# Patient Record
Sex: MALE | Race: WHITE | Employment: UNEMPLOYED | URBAN - METROPOLITAN AREA
[De-identification: names, ages, dates, MRNs, and addresses within clinical notes are randomized per-mention and may not be internally consistent; named-entity substitution may affect disease eponyms.]

---

## 2017-01-10 ENCOUNTER — OFFICE VISIT (OUTPATIENT)
Dept: FAMILY MEDICINE CLINIC | Age: 22
End: 2017-01-10

## 2017-01-10 VITALS
WEIGHT: 186.19 LBS | HEIGHT: 74 IN | RESPIRATION RATE: 12 BRPM | SYSTOLIC BLOOD PRESSURE: 110 MMHG | HEART RATE: 60 BPM | BODY MASS INDEX: 23.89 KG/M2 | DIASTOLIC BLOOD PRESSURE: 64 MMHG

## 2017-01-10 DIAGNOSIS — R53.83 FATIGUE, UNSPECIFIED TYPE: ICD-10-CM

## 2017-01-10 DIAGNOSIS — S76.211A STRAIN OF GROIN, RIGHT, INITIAL ENCOUNTER: Primary | ICD-10-CM

## 2017-01-10 PROCEDURE — 99213 OFFICE O/P EST LOW 20 MIN: CPT | Performed by: FAMILY MEDICINE

## 2017-01-10 ASSESSMENT — ENCOUNTER SYMPTOMS
SORE THROAT: 0
TROUBLE SWALLOWING: 0
BACK PAIN: 0
CONSTIPATION: 0
BLOOD IN STOOL: 0
EYE PAIN: 0
CHEST TIGHTNESS: 0
COUGH: 0
NAUSEA: 0
SHORTNESS OF BREATH: 0
ABDOMINAL PAIN: 0

## 2017-01-11 ENCOUNTER — TELEPHONE (OUTPATIENT)
Dept: FAMILY MEDICINE CLINIC | Age: 22
End: 2017-01-11

## 2017-01-11 LAB
ABSOLUTE BASO #: 0 K/UL (ref 0–0.1)
ABSOLUTE EOS #: 0.2 K/UL (ref 0.1–0.4)
ABSOLUTE LYMPH #: 2.1 K/UL (ref 0.8–5.2)
ABSOLUTE MONO #: 0.3 K/UL (ref 0.1–0.9)
ABSOLUTE NEUT #: 3.2 K/UL (ref 1.3–9.1)
BASOPHILS RELATIVE PERCENT: 0.7 % (ref 0–1)
EOSINOPHILS RELATIVE PERCENT: 3.8 % (ref 1–4)
GLUCOSE: 85 MG/DL (ref 70–100)
HCT VFR BLD CALC: 45.9 % (ref 41.4–51)
HEMOGLOBIN: 15.9 G/DL (ref 13.8–17)
LYMPHOCYTE %: 36 % (ref 16–48)
MCH RBC QN AUTO: 31.1 PG (ref 27–34)
MCHC RBC AUTO-ENTMCNC: 34.6 G/DL (ref 31–36)
MCV RBC AUTO: 89.6 FL (ref 80–100)
MONOCYTES # BLD: 4.8 % (ref 1–8)
NEUTROPHILS RELATIVE PERCENT: 54.5 % (ref 45–75)
PDW BLD-RTO: 12.7 % (ref 10.8–14.8)
PLATELETS: 252 K/UL (ref 150–450)
RBC: 5.12 M/UL (ref 4–5.5)
TSH SERPL DL<=0.05 MIU/L-ACNC: 1.29 UIU/ML (ref 0.4–4.4)
WBC: 5.8 K/UL (ref 3.7–10.8)

## 2018-01-16 ENCOUNTER — OFFICE VISIT (OUTPATIENT)
Dept: FAMILY MEDICINE CLINIC | Age: 23
End: 2018-01-16

## 2018-01-16 VITALS
BODY MASS INDEX: 23.9 KG/M2 | HEART RATE: 60 BPM | WEIGHT: 186.25 LBS | SYSTOLIC BLOOD PRESSURE: 110 MMHG | HEIGHT: 74 IN | DIASTOLIC BLOOD PRESSURE: 60 MMHG | RESPIRATION RATE: 14 BRPM

## 2018-01-16 DIAGNOSIS — L72.0 INCLUSION CYST: Primary | ICD-10-CM

## 2018-01-16 ASSESSMENT — PATIENT HEALTH QUESTIONNAIRE - PHQ9
SUM OF ALL RESPONSES TO PHQ9 QUESTIONS 1 & 2: 0
1. LITTLE INTEREST OR PLEASURE IN DOING THINGS: 0
SUM OF ALL RESPONSES TO PHQ QUESTIONS 1-9: 0
2. FEELING DOWN, DEPRESSED OR HOPELESS: 0

## 2018-01-16 ASSESSMENT — ENCOUNTER SYMPTOMS
SHORTNESS OF BREATH: 0
CONSTIPATION: 0
NAUSEA: 0
BLOOD IN STOOL: 0
SORE THROAT: 0
EYE PAIN: 0
CHEST TIGHTNESS: 0
COUGH: 0
TROUBLE SWALLOWING: 0
BACK PAIN: 0
ABDOMINAL PAIN: 0

## 2018-01-16 NOTE — PROGRESS NOTES
Subjective:      Patient ID: Cassidy Montalvo is a 25 y.o. male. Left  Ear      With   Bump  Noted and  No   Pain        History reviewed. No pertinent past medical history. Review of Systems   Constitutional: Negative for fatigue and fever. HENT: Negative for congestion, ear pain, postnasal drip, sore throat and trouble swallowing. Eyes: Negative for pain. Respiratory: Negative for cough, chest tightness and shortness of breath. Cardiovascular: Negative for chest pain, palpitations and leg swelling. Gastrointestinal: Negative for abdominal pain, blood in stool, constipation and nausea. Genitourinary: Negative for difficulty urinating, frequency and urgency. Musculoskeletal: Negative for arthralgias, back pain, joint swelling and neck stiffness. Skin: Negative for rash. Neurological: Negative for dizziness, weakness and headaches. Hematological: Negative for adenopathy. Does not bruise/bleed easily. Psychiatric/Behavioral: Negative for behavioral problems, dysphoric mood and sleep disturbance. /60 (Site: Right Arm, Position: Sitting, Cuff Size: Medium Adult)   Pulse 60   Resp 14   Ht 6' 1.5\" (1.867 m)   Wt 186 lb 4 oz (84.5 kg)   BMI 24.24 kg/m²   Objective:   Physical Exam   Constitutional: He is oriented to person, place, and time. He appears well-developed and well-nourished. HENT:   Head: Normocephalic and atraumatic. Right Ear: External ear normal.   Left Ear: External ear normal.   Nose: Nose normal.   Mouth/Throat: Oropharynx is clear and moist.   Eyes: Conjunctivae and EOM are normal. Pupils are equal, round, and reactive to light. Fundi nl   Neck: Normal range of motion. Neck supple. No thyromegaly present. Cardiovascular: Normal rate, regular rhythm, normal heart sounds and intact distal pulses. Pulmonary/Chest: Effort normal and breath sounds normal. He has no wheezes. He has no rales. Abdominal: Soft. Bowel sounds are normal. He exhibits no mass. There is no tenderness. Musculoskeletal: Normal range of motion. Lymphadenopathy:     He has no cervical adenopathy. Neurological: He is alert and oriented to person, place, and time. He has normal reflexes. No cranial nerve deficit. Skin: Skin is warm and dry. No rash noted. Behind  The ear  centimeter inclusion cyst   Psychiatric: He has a normal mood and affect. Nursing note and vitals reviewed. Assessment:      1. Inclusion cyst           Plan:      Current Outpatient Prescriptions   Medication Sig Dispense Refill    clindamycin-benzoyl peroxide (BENZACLIN) 1-5 % gel Apply  topically 2 times daily.  Cetirizine HCl (ZYRTEC PO) Take  by mouth. No current facility-administered medications for this visit.            Observe   As  Noted     see  If  Persist

## 2018-06-29 ENCOUNTER — HOSPITAL ENCOUNTER (EMERGENCY)
Age: 23
Discharge: HOME | End: 2018-06-29
Payer: COMMERCIAL

## 2018-06-29 VITALS
TEMPERATURE: 98.9 F | BODY MASS INDEX: 23.74 KG/M2 | SYSTOLIC BLOOD PRESSURE: 119 MMHG | RESPIRATION RATE: 16 BRPM | HEART RATE: 67 BPM | HEIGHT: 74 IN | DIASTOLIC BLOOD PRESSURE: 54 MMHG | WEIGHT: 185 LBS | OXYGEN SATURATION: 98 %

## 2018-06-29 DIAGNOSIS — H10.9 CONJUNCTIVITIS OF BOTH EYES, UNSPECIFIED CONJUNCTIVITIS TYPE: Primary | ICD-10-CM

## 2018-06-29 PROCEDURE — 99201 PR OFFICE OUTPATIENT NEW 10 MINUTES: CPT | Performed by: NURSE PRACTITIONER

## 2018-06-29 PROCEDURE — 99212 OFFICE O/P EST SF 10 MIN: CPT

## 2018-06-29 RX ORDER — POLYMYXIN B SULFATE AND TRIMETHOPRIM 1; 10000 MG/ML; [USP'U]/ML
1 SOLUTION OPHTHALMIC EVERY 4 HOURS
Qty: 1 BOTTLE | Refills: 0 | Status: SHIPPED | OUTPATIENT
Start: 2018-06-29 | End: 2018-07-06

## 2018-06-29 RX ORDER — FLUTICASONE PROPIONATE 50 MCG
1 SPRAY, SUSPENSION (ML) NASAL DAILY
Qty: 1 BOTTLE | Refills: 0 | Status: SHIPPED | OUTPATIENT
Start: 2018-06-29 | End: 2019-08-08

## 2018-06-29 ASSESSMENT — ENCOUNTER SYMPTOMS
EYE DISCHARGE: 1
WHEEZING: 0
ABDOMINAL PAIN: 0
SHORTNESS OF BREATH: 0
NAUSEA: 0
EYE REDNESS: 1
CHEST TIGHTNESS: 0
VOMITING: 0
EYE ITCHING: 1

## 2018-06-29 ASSESSMENT — PAIN SCALES - GENERAL: PAINLEVEL_OUTOF10: 2

## 2018-06-29 ASSESSMENT — PAIN DESCRIPTION - DESCRIPTORS: DESCRIPTORS: ITCHING

## 2018-06-29 ASSESSMENT — PAIN DESCRIPTION - PAIN TYPE: TYPE: ACUTE PAIN

## 2019-08-08 ENCOUNTER — OFFICE VISIT (OUTPATIENT)
Dept: FAMILY MEDICINE CLINIC | Age: 24
End: 2019-08-08

## 2019-08-08 VITALS
HEART RATE: 60 BPM | SYSTOLIC BLOOD PRESSURE: 120 MMHG | DIASTOLIC BLOOD PRESSURE: 64 MMHG | WEIGHT: 184.25 LBS | BODY MASS INDEX: 23.64 KG/M2 | HEIGHT: 74 IN | RESPIRATION RATE: 12 BRPM

## 2019-08-08 DIAGNOSIS — Z00.00 WELL ADULT EXAM: Primary | ICD-10-CM

## 2019-08-08 PROCEDURE — 90715 TDAP VACCINE 7 YRS/> IM: CPT | Performed by: FAMILY MEDICINE

## 2019-08-08 PROCEDURE — 90471 IMMUNIZATION ADMIN: CPT | Performed by: FAMILY MEDICINE

## 2019-08-08 PROCEDURE — 99395 PREV VISIT EST AGE 18-39: CPT | Performed by: FAMILY MEDICINE

## 2019-08-08 ASSESSMENT — PATIENT HEALTH QUESTIONNAIRE - PHQ9
1. LITTLE INTEREST OR PLEASURE IN DOING THINGS: 0
2. FEELING DOWN, DEPRESSED OR HOPELESS: 0
SUM OF ALL RESPONSES TO PHQ QUESTIONS 1-9: 0
SUM OF ALL RESPONSES TO PHQ9 QUESTIONS 1 & 2: 0
SUM OF ALL RESPONSES TO PHQ QUESTIONS 1-9: 0

## 2019-08-08 NOTE — PROGRESS NOTES
CARE VISIT    Harriet Campos  YOB: 1995    Date of Service:  8/8/2019    Chief Complaint:   Robe Kwan is a 22 y.o. male who presents for Comprehensive Annual Evaluation    There are no active problems to display for this patient. Preventive Care:  Last eye exam:   Stable   Exercise:   run and  swim  Fracture within the past 6 months: no      Living will:  no,    will discuss     Review of Systems    No Known Allergies  Prior to Visit Medications    Medication Sig Taking? Authorizing Provider   Cetirizine HCl (ZYRTEC PO) Take  by mouth. Yes Historical Provider, MD       No past medical history on file. Past Surgical History:   Procedure Laterality Date    ELBOW FRACTURE SURGERY  11/01    left    WISDOM TOOTH EXTRACTION  2013    Dr Rajat Maier     No family history on file.   Social History     Socioeconomic History    Marital status: Single     Spouse name: Not on file    Number of children: Not on file    Years of education: Not on file    Highest education level: Not on file   Occupational History    Not on file   Social Needs    Financial resource strain: Not on file    Food insecurity:     Worry: Not on file     Inability: Not on file    Transportation needs:     Medical: Not on file     Non-medical: Not on file   Tobacco Use    Smoking status: Never Smoker    Smokeless tobacco: Never Used   Substance and Sexual Activity    Alcohol use: No    Drug use: No    Sexual activity: Never   Lifestyle    Physical activity:     Days per week: Not on file     Minutes per session: Not on file    Stress: Not on file   Relationships    Social connections:     Talks on phone: Not on file     Gets together: Not on file     Attends Restorationism service: Not on file     Active member of club or organization: Not on file     Attends meetings of clubs or organizations: Not on file     Relationship status: Not on file    Intimate partner violence:     Fear of current or ex partner: Not on General  Vaughn Palomino MD as PCP - [facility] Empaneled Provider    Immunization History   Administered Date(s) Administered    DTaP 1996, 1996, 1996, 11/05/1997, 08/13/2001    HPV Quadrivalent (Gardasil) 06/02/2014, 08/04/2014, 01/05/2015    Hepatitis A 06/02/2014, 01/05/2015    Hepatitis B 1996, 1996, 1996    Hib, unspecified 1996, 1996, 1996, 07/18/1997    MMR 07/18/1997, 08/13/2001    Meningococcal MCV4P (Menactra) 06/02/2014    PPD Test 08/13/2001, 06/02/2014    Polio IPV (IPOL) 08/13/2001    Polio OPV 1996, 1996, 11/05/1997    Tdap (Boostrix, Adacel) 05/05/2012       Health Maintenance Due   Topic Date Due    Varicella Vaccine (1 of 2 - 13+ 2-dose series) 04/09/2009        Assessment/Plan:  There are no diagnoses linked to this encounter. Diagnosis Orders   1. Well adult exam         PLAn       Current Outpatient Medications   Medication Sig Dispense Refill    Cetirizine HCl (ZYRTEC PO) Take  by mouth. No current facility-administered medications for this visit. No results found for this visit on 08/08/19. No follow-ups on file.    Orders Placed This Encounter   Procedures    Tdap (age 6y and older) IM (Boostrix)        see in  One  Year

## 2019-12-19 ENCOUNTER — HOSPITAL ENCOUNTER (EMERGENCY)
Age: 24
Discharge: HOME | End: 2019-12-19
Payer: COMMERCIAL

## 2019-12-19 ENCOUNTER — HOSPITAL ENCOUNTER (EMERGENCY)
Dept: GENERAL RADIOLOGY | Age: 24
Discharge: HOME | End: 2019-12-19
Payer: COMMERCIAL

## 2019-12-19 VITALS
OXYGEN SATURATION: 99 % | WEIGHT: 180 LBS | TEMPERATURE: 97.9 F | SYSTOLIC BLOOD PRESSURE: 124 MMHG | BODY MASS INDEX: 22.38 KG/M2 | HEART RATE: 70 BPM | DIASTOLIC BLOOD PRESSURE: 73 MMHG | HEIGHT: 75 IN | RESPIRATION RATE: 16 BRPM

## 2019-12-19 DIAGNOSIS — M20.002 DEVIATION OF FINGER OF LEFT HAND: Primary | ICD-10-CM

## 2019-12-19 PROCEDURE — 99213 OFFICE O/P EST LOW 20 MIN: CPT | Performed by: NURSE PRACTITIONER

## 2019-12-19 PROCEDURE — 99214 OFFICE O/P EST MOD 30 MIN: CPT

## 2019-12-19 PROCEDURE — 73140 X-RAY EXAM OF FINGER(S): CPT

## 2019-12-19 ASSESSMENT — ENCOUNTER SYMPTOMS
VOMITING: 0
COUGH: 0

## 2019-12-27 ENCOUNTER — TELEPHONE (OUTPATIENT)
Dept: FAMILY MEDICINE CLINIC | Age: 24
End: 2019-12-27

## 2019-12-27 RX ORDER — FLUTICASONE PROPIONATE 0.05 %
CREAM (GRAM) TOPICAL
Qty: 15 G | Refills: 1 | Status: SHIPPED | OUTPATIENT
Start: 2019-12-27 | End: 2020-01-26

## 2020-05-09 ENCOUNTER — TELEPHONE (OUTPATIENT)
Dept: FAMILY MEDICINE CLINIC | Age: 25
End: 2020-05-09

## 2020-05-09 RX ORDER — SULFAMETHOXAZOLE AND TRIMETHOPRIM 800; 160 MG/1; MG/1
1 TABLET ORAL 2 TIMES DAILY
Qty: 28 TABLET | Refills: 0 | Status: SHIPPED | OUTPATIENT
Start: 2020-05-09 | End: 2020-05-23

## 2020-05-09 NOTE — TELEPHONE ENCOUNTER
Nose lesion  Red and now worse  As ? Nasal lesion or  along  hair  Follicle . Bactrim and  bactroban and see  End of week if persist    New prescription called in for patient so please charge.

## 2020-08-21 ENCOUNTER — OFFICE VISIT (OUTPATIENT)
Dept: FAMILY MEDICINE CLINIC | Age: 25
End: 2020-08-21

## 2020-08-21 VITALS
DIASTOLIC BLOOD PRESSURE: 66 MMHG | RESPIRATION RATE: 16 BRPM | HEIGHT: 75 IN | WEIGHT: 184.5 LBS | SYSTOLIC BLOOD PRESSURE: 110 MMHG | HEART RATE: 72 BPM | TEMPERATURE: 97.7 F | BODY MASS INDEX: 22.94 KG/M2

## 2020-08-21 PROCEDURE — 17110 DESTRUCTION B9 LES UP TO 14: CPT | Performed by: FAMILY MEDICINE

## 2020-08-21 ASSESSMENT — ENCOUNTER SYMPTOMS
CONSTIPATION: 0
CHEST TIGHTNESS: 0
SORE THROAT: 0
COUGH: 0
BLOOD IN STOOL: 0
BACK PAIN: 0
NAUSEA: 0
EYE PAIN: 0
TROUBLE SWALLOWING: 0
ABDOMINAL PAIN: 0
SHORTNESS OF BREATH: 0

## 2020-08-21 ASSESSMENT — PATIENT HEALTH QUESTIONNAIRE - PHQ9
1. LITTLE INTEREST OR PLEASURE IN DOING THINGS: 0
SUM OF ALL RESPONSES TO PHQ QUESTIONS 1-9: 0
SUM OF ALL RESPONSES TO PHQ QUESTIONS 1-9: 0
SUM OF ALL RESPONSES TO PHQ9 QUESTIONS 1 & 2: 0
2. FEELING DOWN, DEPRESSED OR HOPELESS: 0

## 2020-08-21 NOTE — PROGRESS NOTES
Subjective:      Patient ID: Milli Ferraro is a 26 y.o. male. Other   This is a new problem. The current episode started more than 1 month ago. The problem has been gradually worsening. Pertinent negatives include no abdominal pain, arthralgias, chest pain, congestion, coughing, fatigue, fever, headaches, joint swelling, nausea, rash, sore throat or weakness. Associated symptoms comments:   Right  Thumb  Wart . Nothing aggravates the symptoms. He has tried nothing for the symptoms. History reviewed. No pertinent past medical history. Review of Systems   Constitutional: Negative for fatigue and fever. HENT: Negative for congestion, ear pain, postnasal drip, sore throat and trouble swallowing. Eyes: Negative for pain. Respiratory: Negative for cough, chest tightness and shortness of breath. Cardiovascular: Negative for chest pain, palpitations and leg swelling. Gastrointestinal: Negative for abdominal pain, blood in stool, constipation and nausea. Genitourinary: Negative for difficulty urinating, frequency and urgency. Musculoskeletal: Negative for arthralgias, back pain, joint swelling and neck stiffness. Skin: Negative for rash. Neurological: Negative for dizziness, weakness and headaches. Hematological: Negative for adenopathy. Does not bruise/bleed easily. Psychiatric/Behavioral: Negative for behavioral problems, dysphoric mood and sleep disturbance. /66 (Site: Right Upper Arm, Position: Sitting, Cuff Size: Medium Adult)   Pulse 72   Temp 97.7 °F (36.5 °C) (Oral)   Resp 16   Ht 6' 3\" (1.905 m)   Wt 184 lb 8 oz (83.7 kg)   BMI 23.06 kg/m²   Objective:   Physical Exam  Skin:     Comments:    Right  Thumb  ;lateral  Side  With  Wart on  Extensor  Surface       Cryo  To  Wart     Assessment:       Diagnosis Orders   1. Viral warts, unspecified type           Plan:      No current outpatient medications on file.      No current facility-administered medications for this visit. No orders of the defined types were placed in this encounter. Orders Placed This Encounter   Procedures    DESTRUCTION BENIGN LESIONS 15 OR MORE     After a verbal consent , the lesion was applied with liquid nitrogen thaw and freeze method and tolerated well the procedure      Wart  Right  Thumb    With  cryo         See    Prn     No results found for this visit on 08/21/20.     Lakeisha Sharma MD

## 2020-09-24 ENCOUNTER — HOSPITAL ENCOUNTER (EMERGENCY)
Age: 25
Discharge: HOME | End: 2020-09-24
Attending: FAMILY MEDICINE
Payer: COMMERCIAL

## 2020-09-24 ENCOUNTER — APPOINTMENT (OUTPATIENT)
Dept: GENERAL RADIOLOGY | Age: 25
End: 2020-09-24
Payer: COMMERCIAL

## 2020-09-24 VITALS
OXYGEN SATURATION: 100 % | RESPIRATION RATE: 15 BRPM | TEMPERATURE: 97.8 F | SYSTOLIC BLOOD PRESSURE: 128 MMHG | BODY MASS INDEX: 23.12 KG/M2 | HEART RATE: 79 BPM | DIASTOLIC BLOOD PRESSURE: 75 MMHG | WEIGHT: 185 LBS

## 2020-09-24 PROCEDURE — 99282 EMERGENCY DEPT VISIT SF MDM: CPT

## 2020-09-24 PROCEDURE — 71045 X-RAY EXAM CHEST 1 VIEW: CPT

## 2020-09-24 PROCEDURE — 74018 RADEX ABDOMEN 1 VIEW: CPT

## 2020-09-24 NOTE — ED NOTES
Presents with complaints of possibly swallowing a small chicken bone. Denies any complaints at this time.      Mara Oneill RN  09/24/20 [id number]

## 2020-09-24 NOTE — ED PROVIDER NOTES
[address] [address]          Pt Name: Andrez Campos  MRN: [id number]  [place] 1995  Date of evaluation: 9/24/2020  Treating Resident Physician: Nate Scruggs MD  Supervising Physician: Dr. Reinaldo Pearson MD    [address] [place] [address]       Chief Complaint   Patient presents with    Other     possibly ingested chicken bone     History obtained from patient and family member at bedside. HISTORY OF PRESENT ILLNESS    HPI  Siomara Rodriguez is a 26 y.o. male who presents to the emergency department for evaluation of possible swallowed foreign body. Patient states approximately 1 hour before ED presentation he was eating chicken when he believes he swallowed a piece of bone. Patient states he felt a sharp object as he swallowed. Patient denies any pain at this time. Patient denies any regurgitation at this time. Denies any foreign body sensation. Patient states he is concerned he swallowed a piece of bone and he is seeking x-rays. Patient denies any chest pain or shortness of breath with deep inspiration or exhalation    Patient denies any dysphagia, regurgitation, throat pain, abdominal pain, nausea vomiting      Patient denies any headache fever chills cough chest pain shortness of breath abdominal pain nausea vomiting diarrhea constipation    The patient has no other acute complaints at this time. REVIEW OF SYSTEMS   Review of Systems      PAST MEDICAL AND SURGICAL HISTORY   No past medical history on file. Past Surgical History:   Procedure Laterality Date    ELBOW FRACTURE SURGERY  11/01    left    WISDOM TOOTH EXTRACTION  2013    Dr Travis Scott   No current facility-administered medications for this encounter. No current outpatient medications on file.       SOCIAL HISTORY     Social History     Social History Narrative    Not on file     Social History     Tobacco Use    Smoking status: Never Smoker    Smokeless tobacco: Never Used   Substance Use Topics    Alcohol use: Yes     Comment: social    Drug use: No         ALLERGIES   No Known Allergies      FAMILY HISTORY     Family History   Problem Relation Age of Onset    High Blood Pressure Father          PREVIOUS RECORDS   Previous records reviewed: Most recent office visit was on 8/21/2020 for viral warts at [facility]. PHYSICAL EXAM     ED Triage Vitals [09/24/20 1513]   BP Temp Temp Source Pulse Resp SpO2 Height Weight   128/75 97.8 °F (36.6 °C) Oral 79 15 100 % -- 185 lb (83.9 kg)     Initial vital signs and nursing assessment reviewed and normal. Pulsoximetry is normal per my interpretation. Additional Vital Signs:  Vitals:    09/24/20 1513   BP: 128/75   Pulse: 79   Resp: 15   Temp: 97.8 °F (36.6 °C)   SpO2: 100%       Physical Exam  Constitutional:       General: He is not in acute distress. Appearance: Normal appearance. He is normal weight. He is not ill-appearing, toxic-appearing or diaphoretic. Comments: Patient sitting comfortably reading a book upon entering room   HENT:      Head: Normocephalic and atraumatic. Right Ear: External ear normal.      Left Ear: External ear normal.      Mouth/Throat:      Mouth: Mucous membranes are moist.   Eyes:      General: No scleral icterus. Right eye: No discharge. Left eye: No discharge. Neck:      Musculoskeletal: Normal range of motion and neck supple. No neck rigidity or muscular tenderness. Comments: No crepitus  No subcu emphysema  No tenderness to palpation  Cardiovascular:      Rate and Rhythm: Normal rate and regular rhythm. Pulses: Normal pulses. Heart sounds: Normal heart sounds. No murmur. No friction rub. No gallop. Pulmonary:      Effort: Pulmonary effort is normal. No respiratory distress. Breath sounds: Normal breath sounds. No stridor. No wheezing, rhonchi or rales. Chest:      Chest wall: No tenderness.    Abdominal: DISPOSITION     Final diagnoses:   No problem, feared complaint unfounded   Swallowed chicken bone, initial encounter     Condition: condition: stable  Dispo: Discharge to home      This transcription was electronically signed. Parts of this transcriptions may have been dictated by use of voice recognition software and electronically transcribed, and parts may have been transcribed with the assistance of an ED scribe. The transcription may contain errors not detected in proofreading. Please refer to my supervising physician's documentation if my documentation differs.     Electronically Signed: Bee Keith, 09/24/20, 4:27 PM       Bee Keith MD  Resident  09/24/20 [id number]

## 2020-09-25 ENCOUNTER — CARE COORDINATION (OUTPATIENT)
Dept: FAMILY MEDICINE CLINIC | Age: 25
End: 2020-09-25

## 2020-10-02 ENCOUNTER — OFFICE VISIT (OUTPATIENT)
Dept: FAMILY MEDICINE CLINIC | Age: 25
End: 2020-10-02

## 2020-10-02 VITALS
HEIGHT: 75 IN | WEIGHT: 184.25 LBS | RESPIRATION RATE: 14 BRPM | BODY MASS INDEX: 22.91 KG/M2 | HEART RATE: 64 BPM | TEMPERATURE: 97.2 F | SYSTOLIC BLOOD PRESSURE: 124 MMHG | DIASTOLIC BLOOD PRESSURE: 74 MMHG

## 2020-10-02 PROCEDURE — 99395 PREV VISIT EST AGE 18-39: CPT | Performed by: FAMILY MEDICINE

## 2020-10-02 RX ORDER — PANTOPRAZOLE SODIUM 40 MG/1
40 TABLET, DELAYED RELEASE ORAL
Qty: 30 TABLET | Refills: 1 | Status: SHIPPED | OUTPATIENT
Start: 2020-10-02 | End: 2021-03-10 | Stop reason: ALTCHOICE

## 2020-10-02 ASSESSMENT — ENCOUNTER SYMPTOMS
SHORTNESS OF BREATH: 0
CONSTIPATION: 0
COUGH: 0
NAUSEA: 0
BACK PAIN: 0
TROUBLE SWALLOWING: 0
EYE PAIN: 0
BLOOD IN STOOL: 0
ABDOMINAL PAIN: 1
SORE THROAT: 0
CHEST TIGHTNESS: 0

## 2020-10-02 NOTE — PROGRESS NOTES
Subjective:      Patient ID: Shahzad Serra is a 26 y.o. male. In  [place] and  ? Of  Eating  A  Bone and xray  Ok        For   [employer]  Exam  Noted and  Stable   Wellness  noted      Abdominal Pain   This is a new problem. The onset quality is gradual. The problem has been gradually worsening. The pain is located in the RUQ. The abdominal pain radiates to the epigastric region (  some  pain  right  side  area ). Pertinent negatives include no arthralgias, constipation, fever, frequency, headaches or nausea. The treatment provided no relief. No past medical history on file.   Past Surgical History:   Procedure Laterality Date    ELBOW FRACTURE SURGERY  11/01    left    WISDOM TOOTH EXTRACTION  2013    Dr Alie Chandler History     Socioeconomic History    Marital status: Single     Spouse name: Not on file    Number of children: Not on file    Years of education: Not on file    Highest education level: Not on file   Occupational History    Not on file   Social Needs    Financial resource strain: Not on file    Food insecurity     Worry: Not on file     Inability: Not on file    Transportation needs     Medical: Not on file     Non-medical: Not on file   Tobacco Use    Smoking status: Never Smoker    Smokeless tobacco: Never Used   Substance and Sexual Activity    Alcohol use: Yes     Comment: social    Drug use: No    Sexual activity: Never   Lifestyle    Physical activity     Days per week: Not on file     Minutes per session: Not on file    Stress: Not on file   Relationships    Social connections     Talks on phone: Not on file     Gets together: Not on file     Attends Spiritism service: Not on file     Active member of club or organization: Not on file     Attends meetings of clubs or organizations: Not on file     Relationship status: Not on file    Intimate partner violence     Fear of current or ex partner: Not on file     Emotionally abused: Not on file     Physically abused: Not on file     Forced sexual activity: Not on file   Other Topics Concern    Not on file   Social History Narrative    Not on file     Family History   Problem Relation Age of Onset    High Blood Pressure Father      Review of Systems   Constitutional: Negative for fatigue and fever. HENT: Negative for congestion, ear pain, postnasal drip, sore throat and trouble swallowing. Eyes: Negative for pain. Respiratory: Negative for cough, chest tightness and shortness of breath. Cardiovascular: Negative for chest pain, palpitations and leg swelling. Gastrointestinal: Positive for abdominal pain. Negative for blood in stool, constipation and nausea. Genitourinary: Negative for difficulty urinating, frequency and urgency. Musculoskeletal: Negative for arthralgias, back pain, joint swelling and neck stiffness. Skin: Negative for rash. Neurological: Negative for dizziness, weakness and headaches. Hematological: Negative for adenopathy. Does not bruise/bleed easily. Psychiatric/Behavioral: Negative for behavioral problems, dysphoric mood and sleep disturbance. /74 (Site: Right Upper Arm, Position: Sitting, Cuff Size: Medium Adult)   Pulse 64   Temp 97.2 °F (36.2 °C) (Oral)   Resp 14   Ht 6' 3\" (1.905 m)   Wt 184 lb 4 oz (83.6 kg)   BMI 23.03 kg/m²   Objective:   Physical Exam  Vitals signs and nursing note reviewed. Constitutional:       Appearance: He is well-developed. HENT:      Head: Normocephalic and atraumatic. Right Ear: External ear normal.      Left Ear: External ear normal.      Nose: Nose normal.   Eyes:      Conjunctiva/sclera: Conjunctivae normal.      Pupils: Pupils are equal, round, and reactive to light. Comments: Fundi nl   Neck:      Musculoskeletal: Normal range of motion and neck supple. Thyroid: No thyromegaly. Cardiovascular:      Rate and Rhythm: Normal rate and regular rhythm. Heart sounds: Normal heart sounds.    Pulmonary:      Effort: Pulmonary effort is normal.      Breath sounds: Normal breath sounds. No wheezing or rales. Abdominal:      General: Bowel sounds are normal.      Palpations: Abdomen is soft. There is no mass. Tenderness: There is no abdominal tenderness. Musculoskeletal: Normal range of motion. Lymphadenopathy:      Cervical: No cervical adenopathy. Skin:     General: Skin is warm and dry. Findings: No rash. Neurological:      Mental Status: He is alert and oriented to person, place, and time. Cranial Nerves: No cranial nerve deficit. Deep Tendon Reflexes: Reflexes are normal and symmetric. Assessment:           Diagnosis Orders   1. Well adult exam     2. Other acute gastritis without hemorrhage  pantoprazole (PROTONIX) 40 MG tablet     Plan:      Current Outpatient Medications   Medication Sig Dispense Refill    pantoprazole (PROTONIX) 40 MG tablet Take 1 tablet by mouth every morning (before breakfast) 30 tablet 1     No current facility-administered medications for this visit. No orders of the defined types were placed in this encounter. Well exam  And stable and  Mild  Gastritis  From  Caffeine  Excess     No orders of the defined types were placed in this encounter.     See  Komal Brandon MD

## 2021-03-10 ENCOUNTER — OFFICE VISIT (OUTPATIENT)
Dept: FAMILY MEDICINE CLINIC | Age: 26
End: 2021-03-10

## 2021-03-10 VITALS
DIASTOLIC BLOOD PRESSURE: 64 MMHG | HEIGHT: 75 IN | SYSTOLIC BLOOD PRESSURE: 118 MMHG | HEART RATE: 68 BPM | TEMPERATURE: 97.7 F | BODY MASS INDEX: 22.54 KG/M2 | WEIGHT: 181.25 LBS | RESPIRATION RATE: 12 BRPM

## 2021-03-10 DIAGNOSIS — K21.9 GASTROESOPHAGEAL REFLUX DISEASE WITHOUT ESOPHAGITIS: ICD-10-CM

## 2021-03-10 DIAGNOSIS — L30.9 DERMATITIS: Primary | ICD-10-CM

## 2021-03-10 PROCEDURE — 99213 OFFICE O/P EST LOW 20 MIN: CPT | Performed by: FAMILY MEDICINE

## 2021-03-10 RX ORDER — FLUTICASONE PROPIONATE 0.05 %
CREAM (GRAM) TOPICAL
Qty: 15 G | Refills: 0 | Status: SHIPPED | OUTPATIENT
Start: 2021-03-10 | End: 2021-04-09

## 2021-03-10 ASSESSMENT — ENCOUNTER SYMPTOMS
ABDOMINAL PAIN: 0
HEARTBURN: 1
BELCHING: 0

## 2021-03-10 ASSESSMENT — PATIENT HEALTH QUESTIONNAIRE - PHQ9
SUM OF ALL RESPONSES TO PHQ QUESTIONS 1-9: 0
1. LITTLE INTEREST OR PLEASURE IN DOING THINGS: 0
2. FEELING DOWN, DEPRESSED OR HOPELESS: 0
SUM OF ALL RESPONSES TO PHQ QUESTIONS 1-9: 0
SUM OF ALL RESPONSES TO PHQ QUESTIONS 1-9: 0
SUM OF ALL RESPONSES TO PHQ9 QUESTIONS 1 & 2: 0

## 2021-03-10 NOTE — PROGRESS NOTES
Subjective:      Patient ID: Yessenia Batres is a 26 y.o. male. 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 He reports no abdominal pain or no belching. This is a chronic problem. The current episode started more than 1 year ago. The problem occurs rarely. The problem has been resolved. The heartburn does not wake him from sleep. The heartburn does not limit his activity. The heartburn doesn't change with position. The symptoms are aggravated by certain foods. He has tried a PPI for the symptoms. No past medical history on file. Review of Systems   Gastrointestinal: Positive for heartburn. Negative for abdominal pain. /64 (Site: Right Upper Arm, Position: Sitting, Cuff Size: Medium Adult)   Pulse 68   Temp 97.7 °F (36.5 °C) (Infrared)   Resp 12   Ht 6' 3\" (1.905 m)   Wt 181 lb 4 oz (82.2 kg)   BMI 22.65 kg/m²   Objective:   Physical Exam  Vitals signs and nursing note reviewed. Constitutional:       Appearance: He is well-developed. HENT:      Head: Normocephalic and atraumatic. Right Ear: External ear normal.      Left Ear: External ear normal.      Nose: Nose normal.   Eyes:      Conjunctiva/sclera: Conjunctivae normal.      Pupils: Pupils are equal, round, and reactive to light. Comments: Fundi nl   Neck:      Musculoskeletal: Normal range of motion and neck supple. Thyroid: No thyromegaly. Cardiovascular:      Rate and Rhythm: Normal rate and regular rhythm. Heart sounds: Normal heart sounds. Pulmonary:      Effort: Pulmonary effort is normal.      Breath sounds: Normal breath sounds. No wheezing or rales. Abdominal:      General: Bowel sounds are normal.      Palpations: Abdomen is soft. There is no mass. Tenderness: There is no abdominal tenderness. Musculoskeletal: Normal range of motion. Lymphadenopathy:      Cervical: No cervical adenopathy.    Skin:     General: Skin is warm and dry. Findings: No rash. Comments:   Tip  Of  Nose with  irritation   Neurological:      Mental Status: He is alert and oriented to person, place, and time. Cranial Nerves: No cranial nerve deficit. Deep Tendon Reflexes: Reflexes are normal and symmetric. Assessment:             Diagnosis Orders   1. Dermatitis  fluticasone (CUTIVATE) 0.05 % cream   2. Gastroesophageal reflux disease without esophagitis        Plan:      Current Outpatient Medications   Medication Sig Dispense Refill    fluticasone (CUTIVATE) 0.05 % cream Apply topically 2 times daily as needed. 15 g 0     No current facility-administered medications for this visit. Skin  Not  A basal  Cell lesion   No orders of the defined types were placed in this encounter.     John Ventura MD

## 2021-12-28 ENCOUNTER — OFFICE VISIT (OUTPATIENT)
Dept: FAMILY MEDICINE CLINIC | Age: 26
End: 2021-12-28

## 2021-12-28 VITALS
WEIGHT: 184.5 LBS | HEART RATE: 60 BPM | SYSTOLIC BLOOD PRESSURE: 132 MMHG | BODY MASS INDEX: 22.94 KG/M2 | HEIGHT: 75 IN | DIASTOLIC BLOOD PRESSURE: 82 MMHG | RESPIRATION RATE: 14 BRPM | TEMPERATURE: 95 F

## 2021-12-28 DIAGNOSIS — B07.9 VIRAL WARTS, UNSPECIFIED TYPE: Primary | ICD-10-CM

## 2021-12-28 PROCEDURE — 17110 DESTRUCTION B9 LES UP TO 14: CPT | Performed by: FAMILY MEDICINE

## 2021-12-28 SDOH — ECONOMIC STABILITY: FOOD INSECURITY: WITHIN THE PAST 12 MONTHS, YOU WORRIED THAT YOUR FOOD WOULD RUN OUT BEFORE YOU GOT MONEY TO BUY MORE.: NEVER TRUE

## 2021-12-28 SDOH — ECONOMIC STABILITY: FOOD INSECURITY: WITHIN THE PAST 12 MONTHS, THE FOOD YOU BOUGHT JUST DIDN'T LAST AND YOU DIDN'T HAVE MONEY TO GET MORE.: NEVER TRUE

## 2021-12-28 ASSESSMENT — ENCOUNTER SYMPTOMS
ABDOMINAL PAIN: 0
BLOOD IN STOOL: 0
CONSTIPATION: 0
EYE PAIN: 0
CHEST TIGHTNESS: 0
COUGH: 0
SORE THROAT: 0
BACK PAIN: 0
NAUSEA: 0
SHORTNESS OF BREATH: 0
TROUBLE SWALLOWING: 0

## 2021-12-28 ASSESSMENT — SOCIAL DETERMINANTS OF HEALTH (SDOH): HOW HARD IS IT FOR YOU TO PAY FOR THE VERY BASICS LIKE FOOD, HOUSING, MEDICAL CARE, AND HEATING?: NOT HARD AT ALL

## 2021-12-28 NOTE — PATIENT INSTRUCTIONS
Quick Note:    Letter sent to patient regarding negative result.    ______   Than before and no tach about

## 2021-12-28 NOTE — PROGRESS NOTES
Subjective:      Patient ID: Lenka Martinez is a 23 y.o. male. Other  This is a new problem. Episode onset:   wart  one  finger  index. Pertinent negatives include no abdominal pain, arthralgias, chest pain, congestion, coughing, fatigue, fever, headaches, joint swelling, nausea, rash, sore throat or weakness. Right    Index  Finger wart    Review of Systems   Constitutional: Negative for fatigue and fever. HENT: Negative for congestion, ear pain, postnasal drip, sore throat and trouble swallowing. Eyes: Negative for pain. Respiratory: Negative for cough, chest tightness and shortness of breath. Cardiovascular: Negative for chest pain, palpitations and leg swelling. Gastrointestinal: Negative for abdominal pain, blood in stool, constipation and nausea. Genitourinary: Negative for difficulty urinating, frequency and urgency. Musculoskeletal: Negative for arthralgias, back pain, joint swelling and neck stiffness. Skin: Negative for rash. Neurological: Negative for dizziness, weakness and headaches. Hematological: Negative for adenopathy. Does not bruise/bleed easily. Psychiatric/Behavioral: Negative for behavioral problems, dysphoric mood and sleep disturbance. /82 (Site: Right Upper Arm, Position: Sitting, Cuff Size: Medium Adult)   Pulse 60   Temp 95 °F (35 °C) (Skin)   Resp 14   Ht 6' 3\" (1.905 m)   Wt 184 lb 8 oz (83.7 kg)   BMI 23.06 kg/m²   Objective:   Physical Exam  Skin:     Comments:   Wart  Right  Index  Finger          Cryo  To  Wart   Assessment:       Diagnosis Orders   1. Viral warts, unspecified type  DESTRUCTION BENIGN LESIONS 15 OR MORE         Plan:      No current outpatient medications on file. No current facility-administered medications for this visit.          Orders Placed This Encounter   Procedures    DESTRUCTION BENIGN LESIONS 15 OR MORE     After a verbal consent , the lesion was applied with liquid nitrogen thaw and freeze method and tolerated well the procedure      One  One  Wart   Right  Index  Finger           Gerline Schirmer, MD

## 2022-06-09 ENCOUNTER — TELEPHONE (OUTPATIENT)
Dept: FAMILY MEDICINE CLINIC | Age: 27
End: 2022-06-09

## 2022-06-09 DIAGNOSIS — L08.9 ABRASION OF NOSE WITH INFECTION, INITIAL ENCOUNTER: Primary | ICD-10-CM

## 2022-06-09 DIAGNOSIS — S00.31XA ABRASION OF NOSE WITH INFECTION, INITIAL ENCOUNTER: ICD-10-CM

## 2022-06-09 DIAGNOSIS — L08.9 ABRASION OF NOSE WITH INFECTION, INITIAL ENCOUNTER: ICD-10-CM

## 2022-06-09 DIAGNOSIS — S00.31XA ABRASION OF NOSE WITH INFECTION, INITIAL ENCOUNTER: Primary | ICD-10-CM

## 2022-06-09 RX ORDER — CEPHALEXIN 500 MG/1
500 CAPSULE ORAL 3 TIMES DAILY
Qty: 30 CAPSULE | Refills: 0 | Status: SHIPPED | OUTPATIENT
Start: 2022-06-09 | End: 2022-06-19

## 2022-06-09 RX ORDER — CEPHALEXIN 500 MG/1
500 CAPSULE ORAL 3 TIMES DAILY
Qty: 30 CAPSULE | Refills: 0 | Status: SHIPPED | OUTPATIENT
Start: 2022-06-09 | End: 2022-06-09 | Stop reason: CLARIF

## 2022-06-09 NOTE — PROGRESS NOTES
Date of last visit:  Visit date not found  Date of next visit:  Visit date not found    Requested Prescriptions     Signed Prescriptions Disp Refills    cephALEXin (KEFLEX) 500 MG capsule 30 capsule 0     Sig: Take 1 capsule by mouth 3 times daily for 10 days

## 2022-06-09 NOTE — TELEPHONE ENCOUNTER
Pt's mom called said that pt has an infection on the end of his nose. Said that she thinks you have seen him in the past couple years for this. Anish Sane it is a red spot on the ball on the end of his nose. Started several days ago. Tried putting Bactroban on it, not getting worse but not getting better either. Said that she thinks you prescribed an oral antibiotic and Bactroban ointment. [facility]    Did not want an appt.

## 2022-06-10 ENCOUNTER — OFFICE VISIT (OUTPATIENT)
Dept: FAMILY MEDICINE CLINIC | Age: 27
End: 2022-06-10

## 2022-06-10 VITALS
SYSTOLIC BLOOD PRESSURE: 114 MMHG | HEART RATE: 60 BPM | WEIGHT: 192.25 LBS | DIASTOLIC BLOOD PRESSURE: 64 MMHG | BODY MASS INDEX: 23.9 KG/M2 | RESPIRATION RATE: 14 BRPM | HEIGHT: 75 IN

## 2022-06-10 DIAGNOSIS — J34.0 CELLULITIS OF NASAL TIP: Primary | ICD-10-CM

## 2022-06-10 DIAGNOSIS — R59.0 ENLARGED SUBMENTAL LYMPH NODE: ICD-10-CM

## 2022-06-10 PROCEDURE — 99213 OFFICE O/P EST LOW 20 MIN: CPT | Performed by: FAMILY MEDICINE

## 2022-06-10 ASSESSMENT — PATIENT HEALTH QUESTIONNAIRE - PHQ9
SUM OF ALL RESPONSES TO PHQ QUESTIONS 1-9: 0
2. FEELING DOWN, DEPRESSED OR HOPELESS: 0
SUM OF ALL RESPONSES TO PHQ QUESTIONS 1-9: 0
SUM OF ALL RESPONSES TO PHQ QUESTIONS 1-9: 0
1. LITTLE INTEREST OR PLEASURE IN DOING THINGS: 0
SUM OF ALL RESPONSES TO PHQ QUESTIONS 1-9: 0
SUM OF ALL RESPONSES TO PHQ9 QUESTIONS 1 & 2: 0

## 2022-06-14 ENCOUNTER — TELEPHONE (OUTPATIENT)
Dept: FAMILY MEDICINE CLINIC | Age: 27
End: 2022-06-14

## 2022-06-14 NOTE — TELEPHONE ENCOUNTER
Susan Hager called stating that pt tested positive last night for covid. Pt woke up yesterday with sore throat and fever. Tested last night and it was positive. She is asking for paxlovid to be called in.     Send to Yadiel Shepard may be reached at [phone]

## 2022-06-14 NOTE — TELEPHONE ENCOUNTER
Date of last visit:  6/10/2022  Date of next visit:  Visit date not found    Requested Prescriptions     Signed Prescriptions Disp Refills    nirmatrelvir/ritonavir (PAXLOVID) 20 x 150 MG & 10 x 100MG 30 tablet 0     Sig: Take 3 tablets (two 150 mg nirmatrelvir and one 100 mg ritonavir tablets) by mouth every 12 hours for 5 days.      Authorizing Provider: Wan Galeana     Ordering User: Stacey Davidson

## 2022-10-12 ENCOUNTER — OFFICE VISIT (OUTPATIENT)
Dept: FAMILY MEDICINE CLINIC | Age: 27
End: 2022-10-12

## 2022-10-12 VITALS
WEIGHT: 188.25 LBS | BODY MASS INDEX: 23.41 KG/M2 | SYSTOLIC BLOOD PRESSURE: 122 MMHG | HEIGHT: 75 IN | RESPIRATION RATE: 12 BRPM | HEART RATE: 60 BPM | DIASTOLIC BLOOD PRESSURE: 78 MMHG

## 2022-10-12 DIAGNOSIS — S62.667S CLOSED NONDISPLACED FRACTURE OF DISTAL PHALANX OF LEFT LITTLE FINGER, SEQUELA: Primary | ICD-10-CM

## 2022-10-12 PROCEDURE — 99213 OFFICE O/P EST LOW 20 MIN: CPT | Performed by: FAMILY MEDICINE

## 2022-10-12 ASSESSMENT — ENCOUNTER SYMPTOMS
BLOOD IN STOOL: 0
CONSTIPATION: 0
COUGH: 0
BACK PAIN: 0
SHORTNESS OF BREATH: 0
NAUSEA: 0
EYE PAIN: 0
ABDOMINAL PAIN: 0
TROUBLE SWALLOWING: 0
CHEST TIGHTNESS: 0
SORE THROAT: 0

## 2022-10-12 NOTE — PROGRESS NOTES
Subjective:      Patient ID: Angel Garcia is a 33 y.o. male. Splint of  left  5th   finger   playing  football  and   caught   football     Hand Injury   Incident onset: 10-8-22   left  5th  finger  fx[place] Injury mechanism: ball   jammed  a finger. The quality of the pain is described as aching. The pain is at a severity of 3/10. The pain is mild. Pertinent negatives include no chest pain. Nothing aggravates the symptoms. No past medical history on file. Review of Systems   Constitutional:  Negative for fatigue and fever. HENT:  Negative for congestion, ear pain, postnasal drip, sore throat and trouble swallowing. Eyes:  Negative for pain. Respiratory:  Negative for cough, chest tightness and shortness of breath. Cardiovascular:  Negative for chest pain, palpitations and leg swelling. Gastrointestinal:  Negative for abdominal pain, blood in stool, constipation and nausea. Genitourinary:  Negative for difficulty urinating, frequency and urgency. Musculoskeletal:  Negative for arthralgias, back pain, joint swelling and neck stiffness. Skin:  Negative for rash. Neurological:  Negative for dizziness, weakness and headaches. Hematological:  Negative for adenopathy. Does not bruise/bleed easily. Psychiatric/Behavioral:  Negative for behavioral problems, dysphoric mood and sleep disturbance.     /78 (Site: Right Upper Arm, Position: Sitting, Cuff Size: Medium Adult)   Pulse 60   Resp 12   Ht 6' 3\" (1.905 m)   Wt 188 lb 4 oz (85.4 kg)   BMI 23.53 kg/m²    Objective:   Physical Exam  Musculoskeletal:      Comments:  Left  5th  finger   from  noted  with  pain  of    the  proximal  phalanx     normal  mcp  motion   pip  motion        Assessment:        ICD-10-CM    1. Closed nondisplaced fracture of distal phalanx of left little finger, sequela  S62.667S              Plan:     Orders Placed This Encounter   Procedures    XR FINGER LEFT (MIN 2 VIEWS)     Standing Status:   Future     Standing Expiration Date:   10/13/2023     Order Specific Question:   Reason for exam:     Answer:   proximal phalanx distal  fx   non -displaced  on  10-8-22          Ice  prn      Xray  in  3  weeks and  see in one  month      Split  off to  shower       No current outpatient medications on file. No current facility-administered medications for this visit.         Elizabeth Cutler MD

## 2022-11-01 ENCOUNTER — HOSPITAL ENCOUNTER (OUTPATIENT)
Dept: GENERAL RADIOLOGY | Age: 27
Discharge: HOME | End: 2022-11-01
Payer: COMMERCIAL

## 2022-11-01 ENCOUNTER — HOSPITAL ENCOUNTER (OUTPATIENT)
Age: 27
Discharge: HOME | End: 2022-11-01
Payer: COMMERCIAL

## 2022-11-01 DIAGNOSIS — S62.667S CLOSED NONDISPLACED FRACTURE OF DISTAL PHALANX OF LEFT LITTLE FINGER, SEQUELA: ICD-10-CM

## 2022-11-01 PROCEDURE — 73140 X-RAY EXAM OF FINGER(S): CPT

## 2022-11-02 ENCOUNTER — TELEPHONE (OUTPATIENT)
Dept: FAMILY MEDICINE CLINIC | Age: 27
End: 2022-11-02

## 2022-11-02 NOTE — TELEPHONE ENCOUNTER
----- Message from Sarahi Qureshi MD sent at 11/2/2022  9:01 AM EDT -----  Keep appt as  the fx is  healing

## 2022-11-02 NOTE — TELEPHONE ENCOUNTER
Pt's mother called to ask when pt could take off finger splint pt's mother stated pt can't move it at the knuckle of his pinky finger on left hand     Please call Gian Gay once addressed [phone] on [employer]IP[employer]

## 2022-11-03 NOTE — TELEPHONE ENCOUNTER
Per verbal order from Dr Perry Both: the splint needs to stay on until his follow up appt in the Office on 11-. Mom informed via Phone.

## 2022-11-15 ENCOUNTER — OFFICE VISIT (OUTPATIENT)
Dept: FAMILY MEDICINE CLINIC | Age: 27
End: 2022-11-15

## 2022-11-15 VITALS
WEIGHT: 186.25 LBS | HEIGHT: 75 IN | RESPIRATION RATE: 12 BRPM | DIASTOLIC BLOOD PRESSURE: 78 MMHG | HEART RATE: 80 BPM | SYSTOLIC BLOOD PRESSURE: 126 MMHG | BODY MASS INDEX: 23.16 KG/M2

## 2022-11-15 DIAGNOSIS — S62.667S CLOSED NONDISPLACED FRACTURE OF DISTAL PHALANX OF LEFT LITTLE FINGER, SEQUELA: Primary | ICD-10-CM

## 2022-11-15 PROCEDURE — 99213 OFFICE O/P EST LOW 20 MIN: CPT | Performed by: FAMILY MEDICINE

## 2022-11-15 ASSESSMENT — ENCOUNTER SYMPTOMS
BACK PAIN: 0
SHORTNESS OF BREATH: 0
COUGH: 0
SORE THROAT: 0
NAUSEA: 0
ABDOMINAL PAIN: 0
TROUBLE SWALLOWING: 0
EYE PAIN: 0
CHEST TIGHTNESS: 0
BLOOD IN STOOL: 0
CONSTIPATION: 0

## 2022-11-15 NOTE — PROGRESS NOTES
Subjective:      Patient ID: Unknown Arnold is a 33 y.o. male. HPI          Left  finger  with pain    noted    date  waw 10-8-22    Review of Systems   Constitutional:  Negative for fatigue and fever. HENT:  Negative for congestion, ear pain, postnasal drip, sore throat and trouble swallowing. Eyes:  Negative for pain. Respiratory:  Negative for cough, chest tightness and shortness of breath. Cardiovascular:  Negative for chest pain, palpitations and leg swelling. Gastrointestinal:  Negative for abdominal pain, blood in stool, constipation and nausea. Genitourinary:  Negative for difficulty urinating, frequency and urgency. Musculoskeletal:  Negative for arthralgias, back pain, joint swelling and neck stiffness. Left        hand  with   5 th  finger   with  pain noted    some     Skin:  Negative for rash. Neurological:  Negative for dizziness, weakness and headaches. Hematological:  Negative for adenopathy. Does not bruise/bleed easily. Psychiatric/Behavioral:  Negative for behavioral problems, dysphoric mood and sleep disturbance. /78 (Site: Right Upper Arm, Position: Sitting, Cuff Size: Medium Adult)   Pulse 80   Resp 12   Ht 6' 3\" (1.905 m)   Wt 186 lb 4 oz (84.5 kg)   BMI 23.28 kg/m²    Objective:   Physical Exam  Vitals and nursing note reviewed. Constitutional:       Appearance: He is well-developed. HENT:      Head: Normocephalic and atraumatic. Right Ear: External ear normal.      Left Ear: External ear normal.      Nose: Nose normal.   Eyes:      Conjunctiva/sclera: Conjunctivae normal.      Pupils: Pupils are equal, round, and reactive to light. Comments: Fundi nl   Neck:      Thyroid: No thyromegaly. Cardiovascular:      Rate and Rhythm: Normal rate and regular rhythm. Heart sounds: Normal heart sounds. Pulmonary:      Effort: Pulmonary effort is normal.      Breath sounds: Normal breath sounds. No wheezing or rales.    Abdominal: General: Bowel sounds are normal.      Palpations: Abdomen is soft. There is no mass. Tenderness: There is no abdominal tenderness. Musculoskeletal:         General: Normal range of motion. Cervical back: Normal range of motion and neck supple. Comments:  Left  5th   finger  swelilng  of the  proximal  phalanx    and    some  limitation  of    flexion  of the  PIP  joint  as  some  swelling   No  pain  at  rest    Lymphadenopathy:      Cervical: No cervical adenopathy. Skin:     General: Skin is warm and dry. Findings: No rash. Neurological:      Mental Status: He is alert and oriented to person, place, and time. Cranial Nerves: No cranial nerve deficit. Deep Tendon Reflexes: Reflexes are normal and symmetric. Assessment:        ICD-10-CM    1. Closed nondisplaced fracture of distal phalanx of left little finger, sequela  S62.667S              Plan:      No current outpatient medications on file. No current facility-administered medications for this visit.           Orders Placed This Encounter   Procedures    XR FINGER LEFT (MIN 2 VIEWS)     Standing Status:   Future     Standing Expiration Date:   11/16/2023     Order Specific Question:   Reason for exam:     Answer:   left    5th  finger             Out  of  splint  50%  and  check xray  Cyrus Hernandez MD

## 2023-01-23 ENCOUNTER — HOSPITAL ENCOUNTER (OUTPATIENT)
Dept: GENERAL RADIOLOGY | Age: 28
Discharge: HOME | End: 2023-01-23
Payer: COMMERCIAL

## 2023-01-23 ENCOUNTER — HOSPITAL ENCOUNTER (OUTPATIENT)
Age: 28
Discharge: HOME | End: 2023-01-23
Payer: COMMERCIAL

## 2023-01-23 DIAGNOSIS — S62.667S CLOSED NONDISPLACED FRACTURE OF DISTAL PHALANX OF LEFT LITTLE FINGER, SEQUELA: ICD-10-CM

## 2023-01-23 PROCEDURE — 73140 X-RAY EXAM OF FINGER(S): CPT

## 2023-01-24 ENCOUNTER — TELEPHONE (OUTPATIENT)
Dept: FAMILY MEDICINE CLINIC | Age: 28
End: 2023-01-24

## 2023-01-24 NOTE — TELEPHONE ENCOUNTER
----- Message from Jose Muhammad MD sent at 1/24/2023  5:10 AM EST -----  Call as fracture  healing well  and if no pain then no need to repeat the xray as almost healed

## 2023-02-15 ENCOUNTER — OFFICE VISIT (OUTPATIENT)
Dept: FAMILY MEDICINE CLINIC | Age: 28
End: 2023-02-15

## 2023-02-15 VITALS
HEIGHT: 75 IN | SYSTOLIC BLOOD PRESSURE: 122 MMHG | DIASTOLIC BLOOD PRESSURE: 82 MMHG | RESPIRATION RATE: 12 BRPM | HEART RATE: 68 BPM | WEIGHT: 183.13 LBS | BODY MASS INDEX: 22.77 KG/M2

## 2023-02-15 DIAGNOSIS — K21.9 GASTROESOPHAGEAL REFLUX DISEASE WITHOUT ESOPHAGITIS: ICD-10-CM

## 2023-02-15 DIAGNOSIS — S62.667S CLOSED NONDISPLACED FRACTURE OF DISTAL PHALANX OF LEFT LITTLE FINGER, SEQUELA: Primary | ICD-10-CM

## 2023-02-15 SDOH — ECONOMIC STABILITY: INCOME INSECURITY: HOW HARD IS IT FOR YOU TO PAY FOR THE VERY BASICS LIKE FOOD, HOUSING, MEDICAL CARE, AND HEATING?: NOT HARD AT ALL

## 2023-02-15 SDOH — ECONOMIC STABILITY: FOOD INSECURITY: WITHIN THE PAST 12 MONTHS, YOU WORRIED THAT YOUR FOOD WOULD RUN OUT BEFORE YOU GOT MONEY TO BUY MORE.: NEVER TRUE

## 2023-02-15 SDOH — ECONOMIC STABILITY: HOUSING INSECURITY
IN THE LAST 12 MONTHS, WAS THERE A TIME WHEN YOU DID NOT HAVE A STEADY PLACE TO SLEEP OR SLEPT IN A SHELTER (INCLUDING NOW)?: NO

## 2023-02-15 SDOH — ECONOMIC STABILITY: FOOD INSECURITY: WITHIN THE PAST 12 MONTHS, THE FOOD YOU BOUGHT JUST DIDN'T LAST AND YOU DIDN'T HAVE MONEY TO GET MORE.: NEVER TRUE

## 2023-02-15 ASSESSMENT — PATIENT HEALTH QUESTIONNAIRE - PHQ9
SUM OF ALL RESPONSES TO PHQ QUESTIONS 1-9: 0
SUM OF ALL RESPONSES TO PHQ QUESTIONS 1-9: 0
SUM OF ALL RESPONSES TO PHQ9 QUESTIONS 1 & 2: 0
SUM OF ALL RESPONSES TO PHQ QUESTIONS 1-9: 0
2. FEELING DOWN, DEPRESSED OR HOPELESS: 0
SUM OF ALL RESPONSES TO PHQ QUESTIONS 1-9: 0
1. LITTLE INTEREST OR PLEASURE IN DOING THINGS: 0

## 2023-02-15 ASSESSMENT — ENCOUNTER SYMPTOMS
BACK PAIN: 0
SHORTNESS OF BREATH: 0
EYE PAIN: 0
CHEST TIGHTNESS: 0
BLOOD IN STOOL: 0
SORE THROAT: 0
TROUBLE SWALLOWING: 0
ABDOMINAL PAIN: 0
CONSTIPATION: 0
COUGH: 0
NAUSEA: 0

## 2023-02-15 NOTE — PROGRESS NOTES
Subjective:      Patient ID: Tessie Dalton is a 33 y.o. male. Left    5  th   finger  distal   finger  noted   No past medical history on file. Past Surgical History:   Procedure Laterality Date    ELBOW FRACTURE SURGERY  11/01    left    WISDOM TOOTH EXTRACTION  2013    Dr Tj Ritter History    Marital status: Single     Spouse name: Not on file    Number of children: Not on file    Years of education: Not on file    Highest education level: Not on file   Occupational History    Not on file   Tobacco Use    Smoking status: Never    Smokeless tobacco: Never   Vaping Use    Vaping Use: Never used   Substance and Sexual Activity    Alcohol use: Yes     Comment: social    Drug use: No    Sexual activity: Never   Other Topics Concern    Not on file   Social History Narrative    Not on file     Social Determinants of Health     Financial Resource Strain: Low Risk     Difficulty of Paying Living Expenses: Not hard at all   Food Insecurity: No Food Insecurity    Worried About Running Out of Food in the Last Year: Never true    [address] in the Last Year: Never true   Transportation Needs: Unknown    Lack of Transportation (Medical): Not on file    Lack of Transportation (Non-Medical): No   Physical Activity: Not on file   Stress: Not on file   Social Connections: Not on file   Intimate Partner Violence: Not on file   Housing Stability: Unknown    Unable to Pay for Housing in the Last Year: Not on file    Number of Places Lived in the Last Year: Not on file    Unstable Housing in the Last Year: No     Family History   Problem Relation Age of Onset    High Blood Pressure Father        Review of Systems   Constitutional:  Negative for fatigue and fever. HENT:  Negative for congestion, ear pain, postnasal drip, sore throat and trouble swallowing. Eyes:  Negative for pain. Respiratory:  Negative for cough, chest tightness and shortness of breath.     Cardiovascular: Negative for chest pain, palpitations and leg swelling. Gastrointestinal:  Negative for abdominal pain, blood in stool, constipation and nausea. Genitourinary:  Negative for difficulty urinating, frequency and urgency. Musculoskeletal:  Negative for arthralgias, back pain, joint swelling and neck stiffness. Skin:  Negative for rash. Neurological:  Negative for dizziness, weakness and headaches. Hematological:  Negative for adenopathy. Does not bruise/bleed easily. Psychiatric/Behavioral:  Negative for behavioral problems, dysphoric mood and sleep disturbance. /82 (Site: Right Upper Arm, Position: Sitting, Cuff Size: Medium Adult)   Pulse 68   Resp 12   Ht 6' 3\" (1.905 m)   Wt 183 lb 2 oz (83.1 kg)   BMI 22.89 kg/m²    Objective:   Physical Exam  Vitals and nursing note reviewed. Constitutional:       Appearance: He is well-developed. HENT:      Head: Normocephalic and atraumatic. Right Ear: External ear normal.      Left Ear: External ear normal.      Nose: Nose normal.   Eyes:      Conjunctiva/sclera: Conjunctivae normal.      Pupils: Pupils are equal, round, and reactive to light. Comments: Fundi nl   Neck:      Thyroid: No thyromegaly. Cardiovascular:      Rate and Rhythm: Normal rate and regular rhythm. Heart sounds: Normal heart sounds. Pulmonary:      Effort: Pulmonary effort is normal.      Breath sounds: Normal breath sounds. No wheezing or rales. Abdominal:      General: Bowel sounds are normal.      Palpations: Abdomen is soft. There is no mass. Tenderness: There is no abdominal tenderness. Musculoskeletal:         General: Normal range of motion. Cervical back: Normal range of motion and neck supple. Lymphadenopathy:      Cervical: No cervical adenopathy. Skin:     General: Skin is warm and dry. Findings: No rash. Neurological:      Mental Status: He is alert and oriented to person, place, and time.       Cranial Nerves: No cranial nerve deficit. Deep Tendon Reflexes: Reflexes are normal and symmetric. Assessment:        ICD-10-CM    1. Closed nondisplaced fracture of distal phalanx of left little finger, sequela  S62.667S       2. Gastroesophageal reflux disease without esophagitis  K21.9              Plan:      No current outpatient medications on file. No current facility-administered medications for this visit.               Finger resolved and  good  rom      Full activity and  if  needs   f work  physical  good  for 6 mths     Jillian Pastrana MD

## 2023-07-13 ENCOUNTER — HOSPITAL ENCOUNTER (EMERGENCY)
Age: 28
Discharge: HOME | End: 2023-07-13
Payer: COMMERCIAL

## 2023-07-13 VITALS
HEART RATE: 56 BPM | TEMPERATURE: 98.7 F | BODY MASS INDEX: 23.12 KG/M2 | WEIGHT: 185 LBS | RESPIRATION RATE: 20 BRPM | SYSTOLIC BLOOD PRESSURE: 107 MMHG | DIASTOLIC BLOOD PRESSURE: 65 MMHG | OXYGEN SATURATION: 98 %

## 2023-07-13 DIAGNOSIS — S46.911A STRAIN OF RIGHT SHOULDER, INITIAL ENCOUNTER: ICD-10-CM

## 2023-07-13 DIAGNOSIS — R21 RASH AND OTHER NONSPECIFIC SKIN ERUPTION: Primary | ICD-10-CM

## 2023-07-13 PROCEDURE — 99213 OFFICE O/P EST LOW 20 MIN: CPT

## 2023-07-13 RX ORDER — TRIAMCINOLONE ACETONIDE 0.25 MG/G
OINTMENT TOPICAL
Qty: 15 G | Refills: 0 | Status: SHIPPED | OUTPATIENT
Start: 2023-07-13 | End: 2023-07-20

## 2023-07-13 ASSESSMENT — PAIN DESCRIPTION - FREQUENCY: FREQUENCY: INTERMITTENT

## 2023-07-13 ASSESSMENT — PAIN - FUNCTIONAL ASSESSMENT
PAIN_FUNCTIONAL_ASSESSMENT: ACTIVITIES ARE NOT PREVENTED
PAIN_FUNCTIONAL_ASSESSMENT: 0-10

## 2023-07-13 ASSESSMENT — PAIN DESCRIPTION - DESCRIPTORS: DESCRIPTORS: DISCOMFORT

## 2023-07-13 ASSESSMENT — PAIN DESCRIPTION - PAIN TYPE: TYPE: ACUTE PAIN

## 2023-07-13 ASSESSMENT — PAIN SCALES - GENERAL: PAINLEVEL_OUTOF10: 1

## 2023-07-13 NOTE — ED PROVIDER NOTES
[facility]  Urgent Care Encounter       CHIEF COMPLAINT       Chief Complaint   Patient presents with    Shoulder Pain     Right      Rash     Bilateral upper legs         Nurses Notes reviewed and I agree except as noted in the HPI. HISTORY OF PRESENT ILLNESS   Brittney Russo is a 33 y.o. male who presents with complaints of right shoulder pain as well as rash to bilateral upper legs. Patient reports that six days ago he was deadlifting and after his workout he began to have right shoulder pain. Patient reports that there is a clicking sound as well as pain. Patient reports that he has not worked out since then and has been using ibuprofen and ice. Patient reports that he has also had a rash on his upper legs. Patient reports that they are \"burning\" at some points. Patient denies them itching. Patient reports that he has been showering downstairs in the basement due to family dynamics. Patient reports no new detergents. Patient denies anyone else in the house having similar symptoms. The history is provided by the patient. REVIEW OF SYSTEMS     Review of Systems   Musculoskeletal:         (R) shoulder pain   Skin:  Positive for rash. All other systems reviewed and are negative. PAST MEDICAL HISTORY   History reviewed. No pertinent past medical history. SURGICALHISTORY     Patient  has a past surgical history that includes Elbow fracture surgery (11/01) and Wade tooth extraction (2013). CURRENT MEDICATIONS       Previous Medications    No medications on file       ALLERGIES     Patient is has No Known Allergies.     Patients   Immunization History   Administered Date(s) Administered    DTaP 1996, 1996, 1996, 11/05/1997, 08/13/2001    HPV Quadrivalent (Gardasil) 06/02/2014, 08/04/2014, 01/05/2015    Hepatitis A 06/02/2014, 01/05/2015    Hepatitis B 1996, 1996, 1996    Hib, unspecified 1996, 1996, 1996, 07/18/1997

## 2023-07-13 NOTE — ED TRIAGE NOTES
Patient to room with c/o right should injury occurring six days ago. States decrease in pain, with continued \"clicking\" with movement. C/o red, raised, circular areas to bilateral upper thighs.

## 2023-07-17 ENCOUNTER — NURSE ONLY (OUTPATIENT)
Dept: LAB | Age: 28
End: 2023-07-17

## 2023-07-17 ENCOUNTER — OFFICE VISIT (OUTPATIENT)
Dept: FAMILY MEDICINE CLINIC | Age: 28
End: 2023-07-17

## 2023-07-17 VITALS
HEART RATE: 64 BPM | WEIGHT: 178.4 LBS | BODY MASS INDEX: 22.18 KG/M2 | DIASTOLIC BLOOD PRESSURE: 68 MMHG | RESPIRATION RATE: 12 BRPM | SYSTOLIC BLOOD PRESSURE: 118 MMHG | HEIGHT: 75 IN

## 2023-07-17 DIAGNOSIS — R21 RASH IN ADULT: Primary | ICD-10-CM

## 2023-07-17 DIAGNOSIS — R21 RASH IN ADULT: ICD-10-CM

## 2023-07-17 LAB
APTT PPP: 34.7 SECONDS (ref 22–38)
BASOPHILS ABSOLUTE: 0 THOU/MM3 (ref 0–0.1)
BASOPHILS NFR BLD AUTO: 0.5 %
DEPRECATED RDW RBC AUTO: 38.5 FL (ref 35–45)
EOSINOPHIL NFR BLD AUTO: 3.5 %
EOSINOPHILS ABSOLUTE: 0.1 THOU/MM3 (ref 0–0.4)
ERYTHROCYTE [DISTWIDTH] IN BLOOD BY AUTOMATED COUNT: 11.5 % (ref 11.5–14.5)
HCT VFR BLD AUTO: 46.5 % (ref 42–52)
HGB BLD-MCNC: 16 GM/DL (ref 14–18)
IMM GRANULOCYTES # BLD AUTO: 0 THOU/MM3 (ref 0–0.07)
IMM GRANULOCYTES NFR BLD AUTO: 0 %
INR PPP: 1.25 (ref 0.85–1.13)
LYMPHOCYTES ABSOLUTE: 1.6 THOU/MM3 (ref 1–4.8)
LYMPHOCYTES NFR BLD AUTO: 39.1 %
MCH RBC QN AUTO: 31.4 PG (ref 26–33)
MCHC RBC AUTO-ENTMCNC: 34.4 GM/DL (ref 32.2–35.5)
MCV RBC AUTO: 91.4 FL (ref 80–94)
MONOCYTES ABSOLUTE: 0.2 THOU/MM3 (ref 0.4–1.3)
MONOCYTES NFR BLD AUTO: 5.7 %
NEUTROPHILS NFR BLD AUTO: 51.2 %
NRBC BLD AUTO-RTO: 0 /100 WBC
PLATELET # BLD AUTO: 202 THOU/MM3 (ref 130–400)
PMV BLD AUTO: 9.4 FL (ref 9.4–12.4)
RBC # BLD AUTO: 5.09 MILL/MM3 (ref 4.7–6.1)
SEGMENTED NEUTROPHILS ABSOLUTE COUNT: 2 THOU/MM3 (ref 1.8–7.7)
WBC # BLD AUTO: 4 THOU/MM3 (ref 4.8–10.8)

## 2023-07-17 PROCEDURE — 99213 OFFICE O/P EST LOW 20 MIN: CPT | Performed by: FAMILY MEDICINE

## 2023-07-17 RX ORDER — MINOXIDIL 2.5 MG/1
2.5 TABLET ORAL DAILY
COMMUNITY

## 2023-07-17 ASSESSMENT — ENCOUNTER SYMPTOMS
RHINORRHEA: 0
ABDOMINAL PAIN: 0
EYES NEGATIVE: 1
COUGH: 0
VOMITING: 0
BLOOD IN STOOL: 0
NAUSEA: 0
CHEST TIGHTNESS: 0
SHORTNESS OF BREATH: 0
SORE THROAT: 0
CONSTIPATION: 0
DIARRHEA: 0

## 2023-07-17 NOTE — PROGRESS NOTES
Date: 7/17/2023    Jace Douglas is a 33 y.o. male who presents today for:  Chief Complaint   Patient presents with    Rash    Follow-up     Urgent Care       HPI:     Rash  This is a new problem. Episode onset: 1 1/2 weeks. The problem has been gradually worsening since onset. The affected locations include the left lower leg and right lower leg. The rash is characterized by itchiness, redness and burning (itching on and off). Associated with: he was taking Ibuprofen few days before he developed the rash for shoulder pain. Pertinent negatives include no congestion, cough, diarrhea, fatigue, fever, joint pain, rhinorrhea, shortness of breath, sore throat or vomiting. Past treatments include topical steroids (benadryl. ).     has a current medication list which includes the following prescription(s): minoxidil and triamcinolone. No Known Allergies    Social History     Tobacco Use    Smoking status: Never    Smokeless tobacco: Never   Vaping Use    Vaping Use: Never used   Substance Use Topics    Alcohol use: Yes     Comment: social    Drug use: No       History reviewed. No pertinent past medical history. Past Surgical History:   Procedure Laterality Date    ELBOW FRACTURE SURGERY  11/01    left    WISDOM TOOTH EXTRACTION  2013    Dr Yandel Craft       Family History   Problem Relation Age of Onset    High Blood Pressure Father      Subjective:     Review of Systems   Constitutional:  Negative for activity change, appetite change, diaphoresis, fatigue and fever. HENT: Negative. Negative for congestion, rhinorrhea and sore throat. Eyes: Negative. Respiratory:  Negative for cough, chest tightness and shortness of breath. Cardiovascular:  Negative for chest pain, palpitations and leg swelling. Gastrointestinal:  Negative for abdominal pain, blood in stool, constipation, diarrhea, nausea and vomiting. Genitourinary: Negative. Musculoskeletal: Negative. Negative for joint pain.    Skin:  Positive

## 2023-07-19 ENCOUNTER — TELEPHONE (OUTPATIENT)
Dept: FAMILY MEDICINE CLINIC | Age: 28
End: 2023-07-19

## 2023-07-19 NOTE — TELEPHONE ENCOUNTER
----- Message from Yee Beltran MD sent at 7/19/2023  4:11 PM EDT -----  Please make sure he has a follow-up appointment with Dr. Steve Salazar.

## 2023-07-24 ENCOUNTER — OFFICE VISIT (OUTPATIENT)
Dept: FAMILY MEDICINE CLINIC | Age: 28
End: 2023-07-24

## 2023-07-24 VITALS
RESPIRATION RATE: 14 BRPM | HEART RATE: 66 BPM | HEIGHT: 75 IN | DIASTOLIC BLOOD PRESSURE: 68 MMHG | SYSTOLIC BLOOD PRESSURE: 128 MMHG | BODY MASS INDEX: 22.41 KG/M2 | WEIGHT: 180.2 LBS

## 2023-07-24 DIAGNOSIS — L73.9 FOLLICULITIS: Primary | ICD-10-CM

## 2023-07-24 PROCEDURE — 99213 OFFICE O/P EST LOW 20 MIN: CPT | Performed by: FAMILY MEDICINE

## 2023-07-24 RX ORDER — HYDROXYZINE HYDROCHLORIDE 25 MG/1
25 TABLET, FILM COATED ORAL EVERY 6 HOURS PRN
Qty: 30 TABLET | Refills: 0 | Status: SHIPPED | OUTPATIENT
Start: 2023-07-24

## 2023-07-24 RX ORDER — CEPHALEXIN 500 MG/1
500 CAPSULE ORAL 3 TIMES DAILY
Qty: 21 CAPSULE | Refills: 0 | Status: SHIPPED | OUTPATIENT
Start: 2023-07-24

## 2023-07-24 ASSESSMENT — ENCOUNTER SYMPTOMS
CHEST TIGHTNESS: 0
SORE THROAT: 0
CONSTIPATION: 0
EYE PAIN: 0
COUGH: 0
TROUBLE SWALLOWING: 0
SHORTNESS OF BREATH: 0
NAUSEA: 0
BACK PAIN: 0
BLOOD IN STOOL: 0
ABDOMINAL PAIN: 0

## 2023-08-04 ENCOUNTER — TELEPHONE (OUTPATIENT)
Dept: FAMILY MEDICINE CLINIC | Age: 28
End: 2023-08-04

## 2023-08-04 RX ORDER — CEPHALEXIN 500 MG/1
500 CAPSULE ORAL 3 TIMES DAILY
Qty: 30 CAPSULE | Refills: 0 | Status: SHIPPED | OUTPATIENT
Start: 2023-08-04

## 2023-08-04 NOTE — TELEPHONE ENCOUNTER
Received a call from patient's mother Alvaro Kirby requesting a refill on cephalexin 503 times daily for patient's folliculitis. She states that he took a course for 7 days and his rash really went away and after he got done the rash started back again. Mother also states that he was seen by dermatologist and he did agree with diagnosis of folliculitis.

## 2023-08-09 ENCOUNTER — TELEPHONE (OUTPATIENT)
Dept: FAMILY MEDICINE CLINIC | Age: 28
End: 2023-08-09

## 2023-08-09 NOTE — TELEPHONE ENCOUNTER
Pt's mother called to ask doctor if pt should stop taking     Minoxidil    Please call Brett Bond on HIPAA once addressed [phone]

## 2023-08-10 NOTE — TELEPHONE ENCOUNTER
Morris Nascimento informed by Phone that Dr. Pam Arredondo did not order this med and doesn't know who did and she would have to check with the Dr. Amarilys Blevins did. Morris Nascimento said no problem she would do that.

## 2023-08-10 NOTE — TELEPHONE ENCOUNTER
I never wrote the medication and not sure who wrote that  as check with that physician    I have historic med as not using

## 2023-08-15 ENCOUNTER — OFFICE VISIT (OUTPATIENT)
Dept: FAMILY MEDICINE CLINIC | Age: 28
End: 2023-08-15

## 2023-08-15 VITALS
BODY MASS INDEX: 22.13 KG/M2 | HEIGHT: 75 IN | WEIGHT: 178 LBS | SYSTOLIC BLOOD PRESSURE: 130 MMHG | DIASTOLIC BLOOD PRESSURE: 70 MMHG | RESPIRATION RATE: 14 BRPM | HEART RATE: 72 BPM

## 2023-08-15 DIAGNOSIS — L73.9 FOLLICULITIS: Primary | ICD-10-CM

## 2023-08-15 RX ORDER — HYDROXYZINE HYDROCHLORIDE 25 MG/1
25 TABLET, FILM COATED ORAL EVERY 8 HOURS PRN
Qty: 30 TABLET | Refills: 0 | Status: SHIPPED | OUTPATIENT
Start: 2023-08-15 | End: 2023-08-25

## 2023-08-15 RX ORDER — DOXYCYCLINE HYCLATE 100 MG/1
100 CAPSULE ORAL 2 TIMES DAILY
Qty: 60 CAPSULE | Refills: 0 | Status: SHIPPED | OUTPATIENT
Start: 2023-08-15 | End: 2023-09-14

## 2023-08-15 NOTE — PROGRESS NOTES
Subjective:      Patient ID: Laura Rosales is a 33 y.o. male. Oral minoxidil  noted   for   hair       Keflex  with  help and  then   returned     Rash  This is a recurrent problem. The current episode started more than 1 month ago. The affected locations include the right upper leg and left upper leg. Past treatments include antibiotics. The treatment provided moderate relief. No past medical history on file. Review of Systems   Skin:  Positive for rash. Itches and  noted     No past medical history on file. Objective:   Physical Exam  Pulmonary:      Effort: Pulmonary effort is normal.   Abdominal:      General: Abdomen is flat. Bowel sounds are normal. There is no distension. Skin:     Findings: Rash present. Assessment:        ICD-10-CM    1. Folliculitis  Q90.1 doxycycline hyclate (VIBRAMYCIN) 100 MG capsule     hydrOXYzine HCl (ATARAX) 25 MG tablet             Plan:      Current Outpatient Medications   Medication Sig Dispense Refill    doxycycline hyclate (VIBRAMYCIN) 100 MG capsule Take 1 capsule by mouth 2 times daily 60 capsule 0    hydrOXYzine HCl (ATARAX) 25 MG tablet Take 1 tablet by mouth every 8 hours as needed for Itching 30 tablet 0    cephALEXin (KEFLEX) 500 MG capsule Take 1 capsule by mouth 3 times daily 30 capsule 0    hydrOXYzine HCl (ATARAX) 25 MG tablet Take 1 tablet by mouth every 6 hours as needed for Itching 30 tablet 0    minoxidil (LONITEN) 2.5 MG tablet Take 1 tablet by mouth daily       No current facility-administered medications for this visit.           Radha Lentz MD

## 2023-08-29 ENCOUNTER — OFFICE VISIT (OUTPATIENT)
Dept: FAMILY MEDICINE CLINIC | Age: 28
End: 2023-08-29

## 2023-08-29 VITALS
WEIGHT: 183.25 LBS | RESPIRATION RATE: 18 BRPM | DIASTOLIC BLOOD PRESSURE: 78 MMHG | HEART RATE: 60 BPM | SYSTOLIC BLOOD PRESSURE: 110 MMHG | HEIGHT: 75 IN | BODY MASS INDEX: 22.78 KG/M2

## 2023-08-29 DIAGNOSIS — L98.9 SKIN LESION: Primary | ICD-10-CM

## 2023-08-29 PROCEDURE — 99213 OFFICE O/P EST LOW 20 MIN: CPT | Performed by: FAMILY MEDICINE

## 2023-08-29 ASSESSMENT — ENCOUNTER SYMPTOMS
BLOOD IN STOOL: 0
EYE PAIN: 0
SHORTNESS OF BREATH: 0
NAUSEA: 0
COUGH: 0
CONSTIPATION: 0
BACK PAIN: 0
ABDOMINAL PAIN: 0
TROUBLE SWALLOWING: 0
SORE THROAT: 0
CHEST TIGHTNESS: 0

## 2023-10-02 ENCOUNTER — TELEPHONE (OUTPATIENT)
Dept: FAMILY MEDICINE CLINIC | Age: 28
End: 2023-10-02

## 2023-10-02 DIAGNOSIS — L67.9 HAIR DISORDER: Primary | ICD-10-CM

## 2023-10-02 NOTE — TELEPHONE ENCOUNTER
Mom called stating that Marah Garay is C/O his hair being dry and brittle and losing it on pillows. Wondering if it could be thyroid related and would like an order for some lab tests.     Please call Jose De Jesus Coello

## 2023-10-05 ENCOUNTER — NURSE ONLY (OUTPATIENT)
Dept: LAB | Age: 28
End: 2023-10-05

## 2023-10-05 DIAGNOSIS — L67.9 HAIR DISORDER: ICD-10-CM

## 2023-10-05 LAB — TSH SERPL DL<=0.005 MIU/L-ACNC: 1.6 UIU/ML (ref 0.4–4.2)

## 2023-10-06 ENCOUNTER — TELEPHONE (OUTPATIENT)
Dept: FAMILY MEDICINE CLINIC | Age: 28
End: 2023-10-06

## 2023-10-06 NOTE — TELEPHONE ENCOUNTER
----- Message from Marjan Griffith MD sent at 10/6/2023 12:16 AM EDT -----  Call as thyroid level was ok

## 2024-05-29 ENCOUNTER — OFFICE VISIT (OUTPATIENT)
Dept: FAMILY MEDICINE CLINIC | Age: 29
End: 2024-05-29

## 2024-05-29 VITALS
DIASTOLIC BLOOD PRESSURE: 74 MMHG | RESPIRATION RATE: 18 BRPM | WEIGHT: 192.5 LBS | HEART RATE: 60 BPM | BODY MASS INDEX: 23.94 KG/M2 | HEIGHT: 75 IN | SYSTOLIC BLOOD PRESSURE: 126 MMHG

## 2024-05-29 DIAGNOSIS — R53.83 FATIGUE, UNSPECIFIED TYPE: Primary | ICD-10-CM

## 2024-05-29 PROCEDURE — G8427 DOCREV CUR MEDS BY ELIG CLIN: HCPCS | Performed by: FAMILY MEDICINE

## 2024-05-29 PROCEDURE — G8420 CALC BMI NORM PARAMETERS: HCPCS | Performed by: FAMILY MEDICINE

## 2024-05-29 PROCEDURE — 99213 OFFICE O/P EST LOW 20 MIN: CPT | Performed by: FAMILY MEDICINE

## 2024-05-29 PROCEDURE — 1036F TOBACCO NON-USER: CPT | Performed by: FAMILY MEDICINE

## 2024-05-29 RX ORDER — MINOXIDIL 2.5 MG/1
2.5 TABLET ORAL EVERY OTHER DAY
COMMUNITY
Start: 2024-05-10

## 2024-05-29 SDOH — ECONOMIC STABILITY: INCOME INSECURITY: HOW HARD IS IT FOR YOU TO PAY FOR THE VERY BASICS LIKE FOOD, HOUSING, MEDICAL CARE, AND HEATING?: NOT HARD AT ALL

## 2024-05-29 SDOH — ECONOMIC STABILITY: FOOD INSECURITY: WITHIN THE PAST 12 MONTHS, YOU WORRIED THAT YOUR FOOD WOULD RUN OUT BEFORE YOU GOT MONEY TO BUY MORE.: NEVER TRUE

## 2024-05-29 SDOH — ECONOMIC STABILITY: FOOD INSECURITY: WITHIN THE PAST 12 MONTHS, THE FOOD YOU BOUGHT JUST DIDN'T LAST AND YOU DIDN'T HAVE MONEY TO GET MORE.: NEVER TRUE

## 2024-05-29 ASSESSMENT — PATIENT HEALTH QUESTIONNAIRE - PHQ9
SUM OF ALL RESPONSES TO PHQ QUESTIONS 1-9: 0
SUM OF ALL RESPONSES TO PHQ QUESTIONS 1-9: 0
SUM OF ALL RESPONSES TO PHQ9 QUESTIONS 1 & 2: 0
2. FEELING DOWN, DEPRESSED OR HOPELESS: NOT AT ALL
SUM OF ALL RESPONSES TO PHQ QUESTIONS 1-9: 0
1. LITTLE INTEREST OR PLEASURE IN DOING THINGS: NOT AT ALL
SUM OF ALL RESPONSES TO PHQ QUESTIONS 1-9: 0

## 2024-05-29 ASSESSMENT — ENCOUNTER SYMPTOMS
TROUBLE SWALLOWING: 0
BLOOD IN STOOL: 0
ABDOMINAL PAIN: 0
COUGH: 0
BACK PAIN: 0
CHEST TIGHTNESS: 0
EYE PAIN: 0
NAUSEA: 0
SHORTNESS OF BREATH: 0
CONSTIPATION: 0
SORE THROAT: 0

## 2024-05-29 NOTE — PROGRESS NOTES
round, and reactive to light.      Comments: Fundi nl   Neck:      Thyroid: No thyromegaly.   Cardiovascular:      Rate and Rhythm: Normal rate and regular rhythm.      Heart sounds: Normal heart sounds.   Pulmonary:      Effort: Pulmonary effort is normal.      Breath sounds: Normal breath sounds. No wheezing or rales.   Abdominal:      General: Bowel sounds are normal.      Palpations: Abdomen is soft. There is no mass.      Tenderness: There is no abdominal tenderness.   Musculoskeletal:         General: Normal range of motion.      Cervical back: Normal range of motion and neck supple.   Lymphadenopathy:      Cervical: No cervical adenopathy.   Skin:     General: Skin is warm and dry.      Findings: No rash.   Neurological:      Mental Status: He is alert and oriented to person, place, and time.      Cranial Nerves: No cranial nerve deficit.      Deep Tendon Reflexes: Reflexes are normal and symmetric.            Assessment     ICD-10-CM    1. Fatigue, unspecified type  R53.83              Plan   Current Outpatient Medications   Medication Sig Dispense Refill    minoxidil (LONITEN) 2.5 MG tablet Take 1 tablet by mouth every other day       No current facility-administered medications for this visit.          Orders Placed This Encounter   Procedures    TSH with Reflex     Standing Status:   Future     Standing Expiration Date:   5/29/2025    CBC with Auto Differential     Standing Status:   Future     Standing Expiration Date:   5/29/2025    Basic Metabolic Panel     Standing Status:   Future     Standing Expiration Date:   5/29/2025            See in    prn    Reinaldo Ventura MD

## 2024-06-05 ENCOUNTER — NURSE ONLY (OUTPATIENT)
Dept: LAB | Age: 29
End: 2024-06-05

## 2024-06-05 DIAGNOSIS — R53.83 FATIGUE, UNSPECIFIED TYPE: ICD-10-CM

## 2024-06-05 LAB
ANION GAP SERPL CALC-SCNC: 5 MEQ/L (ref 8–16)
BASOPHILS ABSOLUTE: 0 THOU/MM3 (ref 0–0.1)
BASOPHILS NFR BLD AUTO: 0.9 %
BUN SERPL-MCNC: 16 MG/DL (ref 7–22)
CALCIUM SERPL-MCNC: 9.6 MG/DL (ref 8.5–10.5)
CHLORIDE SERPL-SCNC: 104 MEQ/L (ref 98–111)
CO2 SERPL-SCNC: 32 MEQ/L (ref 23–33)
CREAT SERPL-MCNC: 1 MG/DL (ref 0.4–1.2)
DEPRECATED RDW RBC AUTO: 38.1 FL (ref 35–45)
EOSINOPHIL NFR BLD AUTO: 6.3 %
EOSINOPHILS ABSOLUTE: 0.3 THOU/MM3 (ref 0–0.4)
ERYTHROCYTE [DISTWIDTH] IN BLOOD BY AUTOMATED COUNT: 11.5 % (ref 11.5–14.5)
GFR SERPL CREATININE-BSD FRML MDRD: > 90 ML/MIN/1.73M2
GLUCOSE SERPL-MCNC: 84 MG/DL (ref 70–108)
HCT VFR BLD AUTO: 45.5 % (ref 42–52)
HGB BLD-MCNC: 15.8 GM/DL (ref 14–18)
IMM GRANULOCYTES # BLD AUTO: 0 THOU/MM3 (ref 0–0.07)
IMM GRANULOCYTES NFR BLD AUTO: 0 %
LYMPHOCYTES ABSOLUTE: 2.3 THOU/MM3 (ref 1–4.8)
LYMPHOCYTES NFR BLD AUTO: 53.4 %
MCH RBC QN AUTO: 31.6 PG (ref 26–33)
MCHC RBC AUTO-ENTMCNC: 34.7 GM/DL (ref 32.2–35.5)
MCV RBC AUTO: 91 FL (ref 80–94)
MONOCYTES ABSOLUTE: 0.3 THOU/MM3 (ref 0.4–1.3)
MONOCYTES NFR BLD AUTO: 5.9 %
NEUTROPHILS ABSOLUTE: 1.5 THOU/MM3 (ref 1.8–7.7)
NEUTROPHILS NFR BLD AUTO: 33.5 %
NRBC BLD AUTO-RTO: 0 /100 WBC
PLATELET # BLD AUTO: 201 THOU/MM3 (ref 130–400)
PMV BLD AUTO: 9.4 FL (ref 9.4–12.4)
POTASSIUM SERPL-SCNC: 4.3 MEQ/L (ref 3.5–5.2)
RBC # BLD AUTO: 5 MILL/MM3 (ref 4.7–6.1)
SODIUM SERPL-SCNC: 141 MEQ/L (ref 135–145)
TSH SERPL DL<=0.005 MIU/L-ACNC: 2.24 UIU/ML (ref 0.4–4.2)
WBC # BLD AUTO: 4.4 THOU/MM3 (ref 4.8–10.8)

## 2024-06-06 ENCOUNTER — TELEPHONE (OUTPATIENT)
Dept: FAMILY MEDICINE CLINIC | Age: 29
End: 2024-06-06

## 2024-06-06 NOTE — TELEPHONE ENCOUNTER
----- Message from Reinaldo Ventura MD sent at 6/6/2024  5:56 AM EDT -----  Call as labs  are stable

## 2024-07-11 ENCOUNTER — OFFICE VISIT (OUTPATIENT)
Dept: FAMILY MEDICINE CLINIC | Age: 29
End: 2024-07-11

## 2024-07-11 VITALS
SYSTOLIC BLOOD PRESSURE: 116 MMHG | BODY MASS INDEX: 24.06 KG/M2 | RESPIRATION RATE: 18 BRPM | WEIGHT: 193.5 LBS | HEART RATE: 60 BPM | DIASTOLIC BLOOD PRESSURE: 74 MMHG | HEIGHT: 75 IN

## 2024-07-11 DIAGNOSIS — L73.9 FOLLICULITIS: Primary | ICD-10-CM

## 2024-07-11 RX ORDER — DESOXIMETASONE 2.5 MG/G
CREAM TOPICAL
Qty: 30 G | Refills: 0 | Status: SHIPPED | OUTPATIENT
Start: 2024-07-11

## 2024-07-11 RX ORDER — CEPHALEXIN 500 MG/1
500 CAPSULE ORAL 3 TIMES DAILY
Qty: 30 CAPSULE | Refills: 0 | Status: SHIPPED | OUTPATIENT
Start: 2024-07-11

## 2024-07-11 ASSESSMENT — ENCOUNTER SYMPTOMS
NAUSEA: 0
EYE PAIN: 0
SORE THROAT: 0
BACK PAIN: 0
BLOOD IN STOOL: 0
CHEST TIGHTNESS: 0
SHORTNESS OF BREATH: 0
COUGH: 0
ABDOMINAL PAIN: 0
TROUBLE SWALLOWING: 0
CONSTIPATION: 0

## 2024-07-11 NOTE — PROGRESS NOTES
Subjective   Patient ID: Andrey Campos is a 29 y.o. male.    Rash  This is a new problem. The current episode started in the past 7 days. The affected locations include the left upper leg and right upper leg. The rash is characterized by itchiness. Pertinent negatives include no congestion, cough, eye pain, fatigue, fever, shortness of breath or sore throat. Past treatments include anti-itch cream.     No past medical history on file.   Review of Systems   Constitutional:  Negative for fatigue and fever.   HENT:  Negative for congestion, ear pain, postnasal drip, sore throat and trouble swallowing.    Eyes:  Negative for pain.   Respiratory:  Negative for cough, chest tightness and shortness of breath.    Cardiovascular:  Negative for chest pain, palpitations and leg swelling.   Gastrointestinal:  Negative for abdominal pain, blood in stool, constipation and nausea.   Genitourinary:  Negative for difficulty urinating, frequency and urgency.   Musculoskeletal:  Negative for arthralgias, back pain, joint swelling and neck stiffness.   Skin:  Positive for rash.   Neurological:  Negative for dizziness, weakness and headaches.   Hematological:  Negative for adenopathy. Does not bruise/bleed easily.   Psychiatric/Behavioral:  Negative for behavioral problems, dysphoric mood and sleep disturbance.         /74 (Site: Right Upper Arm, Position: Sitting, Cuff Size: Medium Adult)   Pulse 60   Resp 18   Ht 1.905 m (6' 3\")   Wt 87.8 kg (193 lb 8 oz)   BMI 24.19 kg/m²    Objective   Physical Exam  Skin:     Findings: Rash present.      Comments:     Worse  popliteal  area  of    the   knees and  isolated   area  of the  thighs            Assessment     ICD-10-CM    1. Folliculitis  L73.9              Plan          Current Outpatient Medications   Medication Sig Dispense Refill    cephALEXin (KEFLEX) 500 MG capsule Take 1 capsule by mouth 3 times daily 30 capsule 0    desoximetasone (TOPICORT) 0.25 % cream

## 2024-08-19 DIAGNOSIS — L73.9 FOLLICULITIS: ICD-10-CM

## 2024-08-19 RX ORDER — CEPHALEXIN 500 MG/1
500 CAPSULE ORAL 3 TIMES DAILY
Qty: 30 CAPSULE | Refills: 0 | Status: SHIPPED | OUTPATIENT
Start: 2024-08-19

## 2024-08-19 NOTE — TELEPHONE ENCOUNTER
Pt MoM stopped by req ref cephALEXin (KEFLEX) 500 MG capsule toi 1 capsule by mouth 3 times daily 30 supply    [address]

## 2024-08-19 NOTE — TELEPHONE ENCOUNTER
Date of last visit:  7/11/2024  Date of next visit:  Visit date not found    Requested Prescriptions     Pending Prescriptions Disp Refills    cephALEXin (KEFLEX) 500 MG capsule 30 capsule 0     Sig: Take 1 capsule by mouth 3 times daily

## 2024-09-05 ENCOUNTER — OFFICE VISIT (OUTPATIENT)
Dept: FAMILY MEDICINE CLINIC | Age: 29
End: 2024-09-05

## 2024-09-05 VITALS
SYSTOLIC BLOOD PRESSURE: 122 MMHG | HEIGHT: 75 IN | HEART RATE: 64 BPM | BODY MASS INDEX: 23.78 KG/M2 | DIASTOLIC BLOOD PRESSURE: 84 MMHG | RESPIRATION RATE: 18 BRPM | WEIGHT: 191.25 LBS

## 2024-09-05 DIAGNOSIS — L73.9 FOLLICULITIS: Primary | ICD-10-CM

## 2024-09-05 RX ORDER — MUPIROCIN 20 MG/G
OINTMENT TOPICAL
Qty: 30 G | Refills: 1 | Status: SHIPPED | OUTPATIENT
Start: 2024-09-05 | End: 2024-09-12

## 2024-09-05 NOTE — PROGRESS NOTES
Subjective   Patient ID: Andrey Campos is a 29 y.o. male.    Rash  This is a recurrent problem. The current episode started in the past 7 days. The affected locations include the right upper leg. The rash is characterized by redness. He was exposed to nothing. Past treatments include nothing. The treatment provided mild relief.     No past medical history on file.   Review of Systems   Skin:  Positive for rash.        /84 (Site: Right Upper Arm, Position: Sitting, Cuff Size: Medium Adult)   Pulse 64   Resp 18   Ht 1.905 m (6' 3\")   Wt 86.8 kg (191 lb 4 oz)   BMI 23.90 kg/m²    Objective   Physical Exam  Skin:     Comments:    Hair  follicle    isolated   hair  follicle  right  thigh  noted   and  small  white    head             Assessment           ICD-10-CM    1. Folliculitis  L73.9           Plan   Current Outpatient Medications   Medication Sig Dispense Refill    mupirocin (BACTROBAN) 2 % ointment Apply topically 3 times daily prn 30 g 1    desoximetasone (TOPICORT) 0.25 % cream Apply topically 2 times daily. 30 g 0    minoxidil (LONITEN) 2.5 MG tablet Take 1 tablet by mouth every other day       No current facility-administered medications for this visit.          See prn    Reinaldo Ventura MD

## 2025-05-14 ENCOUNTER — OFFICE VISIT (OUTPATIENT)
Dept: FAMILY MEDICINE CLINIC | Age: 30
End: 2025-05-14

## 2025-05-14 VITALS
WEIGHT: 202.38 LBS | RESPIRATION RATE: 18 BRPM | DIASTOLIC BLOOD PRESSURE: 76 MMHG | HEART RATE: 68 BPM | HEIGHT: 75 IN | SYSTOLIC BLOOD PRESSURE: 124 MMHG | BODY MASS INDEX: 25.16 KG/M2

## 2025-05-14 DIAGNOSIS — B07.9 VIRAL WARTS, UNSPECIFIED TYPE: Primary | ICD-10-CM

## 2025-05-14 SDOH — ECONOMIC STABILITY: FOOD INSECURITY: WITHIN THE PAST 12 MONTHS, YOU WORRIED THAT YOUR FOOD WOULD RUN OUT BEFORE YOU GOT MONEY TO BUY MORE.: NEVER TRUE

## 2025-05-14 SDOH — ECONOMIC STABILITY: FOOD INSECURITY: WITHIN THE PAST 12 MONTHS, THE FOOD YOU BOUGHT JUST DIDN'T LAST AND YOU DIDN'T HAVE MONEY TO GET MORE.: NEVER TRUE

## 2025-05-14 ASSESSMENT — PATIENT HEALTH QUESTIONNAIRE - PHQ9
SUM OF ALL RESPONSES TO PHQ QUESTIONS 1-9: 0
1. LITTLE INTEREST OR PLEASURE IN DOING THINGS: NOT AT ALL
2. FEELING DOWN, DEPRESSED OR HOPELESS: NOT AT ALL
SUM OF ALL RESPONSES TO PHQ QUESTIONS 1-9: 0

## 2025-05-14 NOTE — PROGRESS NOTES
Subjective   Patient ID: Andrey Campos is a 30 y.o. male.    Skin Problem  This is a new problem. The current episode started more than 1 year ago. The problem has been resolved since onset. The affected locations include the left toes (left  large  toe).     No past medical history on file.   Review of Systems   Skin:           Wart  large   toe          /76 (BP Site: Right Upper Arm, Patient Position: Sitting, BP Cuff Size: Medium Adult)   Pulse 68   Resp 18   Ht 1.905 m (6' 3\")   Wt 91.8 kg (202 lb 6 oz)   BMI 25.30 kg/m²    Objective   Physical Exam  Skin:     Findings: Rash present.      Comments:    Left   large  toe  mediallyl  one  large  wart and  2  small   warts            Cryo   to  the large  toe   Assessment     ICD-10-CM    1. Viral warts, unspecified type  B07.9 DESTRUC BENIGN LESION, UP TO 14 LESIONS             Plan   Current Outpatient Medications   Medication Sig Dispense Refill    desoximetasone (TOPICORT) 0.25 % cream Apply topically 2 times daily. 30 g 0    minoxidil (LONITEN) 2.5 MG tablet Take 1 tablet by mouth every other day       No current facility-administered medications for this visit.      Orders Placed This Encounter   Procedures    DESTRUC BENIGN LESION, UP TO 14 LESIONS     After a verbal consent , the lesion was applied with liquid nitrogen thaw and freeze method and tolerated well the procedure      Left  large  toe  with  one  large and  2  small and   with  cryo            Reinaldo Ventura MD

## 2025-08-24 ENCOUNTER — HOSPITAL ENCOUNTER (EMERGENCY)
Age: 30
Discharge: HOME | End: 2025-08-24
Payer: COMMERCIAL

## 2025-08-24 VITALS
TEMPERATURE: 98 F | OXYGEN SATURATION: 98 % | HEART RATE: 73 BPM | DIASTOLIC BLOOD PRESSURE: 70 MMHG | RESPIRATION RATE: 16 BRPM | SYSTOLIC BLOOD PRESSURE: 124 MMHG

## 2025-08-24 DIAGNOSIS — J06.9 ACUTE UPPER RESPIRATORY INFECTION: Primary | ICD-10-CM

## 2025-08-24 LAB — SARS-COV-2 RDRP RESP QL NAA+PROBE: NOT  DETECTED

## 2025-08-24 PROCEDURE — 99213 OFFICE O/P EST LOW 20 MIN: CPT

## 2025-08-24 PROCEDURE — 87635 SARS-COV-2 COVID-19 AMP PRB: CPT

## 2025-08-24 RX ORDER — AZITHROMYCIN 250 MG/1
TABLET, FILM COATED ORAL
Qty: 1 PACKET | Refills: 0 | Status: SHIPPED | OUTPATIENT
Start: 2025-08-24 | End: 2025-08-28

## 2025-08-24 RX ORDER — BROMPHENIRAMINE MALEATE, PSEUDOEPHEDRINE HYDROCHLORIDE, AND DEXTROMETHORPHAN HYDROBROMIDE 2; 30; 10 MG/5ML; MG/5ML; MG/5ML
5 SYRUP ORAL 4 TIMES DAILY PRN
Qty: 118 ML | Refills: 0 | Status: SHIPPED | OUTPATIENT
Start: 2025-08-24 | End: 2025-08-31

## 2025-08-24 RX ORDER — PREDNISONE 20 MG/1
20 TABLET ORAL DAILY
Qty: 5 TABLET | Refills: 0 | Status: SHIPPED | OUTPATIENT
Start: 2025-08-24 | End: 2025-08-29

## 2025-08-24 ASSESSMENT — PAIN - FUNCTIONAL ASSESSMENT
PAIN_FUNCTIONAL_ASSESSMENT: ACTIVITIES ARE NOT PREVENTED
PAIN_FUNCTIONAL_ASSESSMENT: 0-10

## 2025-08-24 ASSESSMENT — PAIN DESCRIPTION - PAIN TYPE: TYPE: ACUTE PAIN

## 2025-08-24 ASSESSMENT — PAIN DESCRIPTION - LOCATION: LOCATION: CHEST

## 2025-08-24 ASSESSMENT — PAIN SCALES - GENERAL: PAINLEVEL_OUTOF10: 6

## 2025-08-24 ASSESSMENT — PAIN DESCRIPTION - FREQUENCY: FREQUENCY: CONTINUOUS

## 2025-08-24 ASSESSMENT — PAIN DESCRIPTION - DESCRIPTORS: DESCRIPTORS: TIGHTNESS
